# Patient Record
Sex: MALE | Race: WHITE | Employment: UNEMPLOYED | URBAN - METROPOLITAN AREA
[De-identification: names, ages, dates, MRNs, and addresses within clinical notes are randomized per-mention and may not be internally consistent; named-entity substitution may affect disease eponyms.]

---

## 2017-06-02 ENCOUNTER — APPOINTMENT (OUTPATIENT)
Dept: ULTRASOUND IMAGING | Facility: HOSPITAL | Age: 37
DRG: 872 | End: 2017-06-02
Attending: EMERGENCY MEDICINE
Payer: COMMERCIAL

## 2017-06-02 ENCOUNTER — HOSPITAL ENCOUNTER (INPATIENT)
Facility: HOSPITAL | Age: 37
LOS: 6 days | Discharge: HOME | DRG: 872 | End: 2017-06-08
Attending: EMERGENCY MEDICINE | Admitting: HOSPITALIST
Payer: COMMERCIAL

## 2017-06-02 ENCOUNTER — APPOINTMENT (OUTPATIENT)
Dept: GENERAL RADIOLOGY | Facility: HOSPITAL | Age: 37
DRG: 872 | End: 2017-06-02
Attending: EMERGENCY MEDICINE
Payer: COMMERCIAL

## 2017-06-02 DIAGNOSIS — L03.115 CELLULITIS OF RIGHT LOWER EXTREMITY: ICD-10-CM

## 2017-06-02 DIAGNOSIS — A41.9 SEPSIS, DUE TO UNSPECIFIED ORGANISM: ICD-10-CM

## 2017-06-02 DIAGNOSIS — K72.00 ACUTE LIVER FAILURE WITHOUT HEPATIC COMA: Primary | ICD-10-CM

## 2017-06-02 DIAGNOSIS — R18.8 CIRRHOSIS OF LIVER WITH ASCITES, UNSPECIFIED HEPATIC CIRRHOSIS TYPE (HCC): ICD-10-CM

## 2017-06-02 DIAGNOSIS — K74.60 CIRRHOSIS OF LIVER WITH ASCITES, UNSPECIFIED HEPATIC CIRRHOSIS TYPE (HCC): ICD-10-CM

## 2017-06-02 DIAGNOSIS — D69.6 THROMBOCYTOPENIA (HCC): ICD-10-CM

## 2017-06-02 PROBLEM — E87.6 HYPOKALEMIA: Status: ACTIVE | Noted: 2017-06-02

## 2017-06-02 PROBLEM — D64.9 ANEMIA: Status: ACTIVE | Noted: 2017-06-02

## 2017-06-02 PROBLEM — E87.2 METABOLIC ACIDOSIS: Status: ACTIVE | Noted: 2017-06-02

## 2017-06-02 PROCEDURE — 76700 US EXAM ABDOM COMPLETE: CPT | Performed by: EMERGENCY MEDICINE

## 2017-06-02 PROCEDURE — 71020 XR CHEST PA + LAT CHEST (CPT=71020): CPT | Performed by: EMERGENCY MEDICINE

## 2017-06-02 PROCEDURE — 99223 1ST HOSP IP/OBS HIGH 75: CPT | Performed by: HOSPITALIST

## 2017-06-02 RX ORDER — PENTOXIFYLLINE 400 MG/1
400 TABLET, EXTENDED RELEASE ORAL
Status: DISCONTINUED | OUTPATIENT
Start: 2017-06-02 | End: 2017-06-07

## 2017-06-02 RX ORDER — IBUPROFEN 400 MG/1
600 TABLET ORAL EVERY 4 HOURS PRN
Status: DISCONTINUED | OUTPATIENT
Start: 2017-06-02 | End: 2017-06-07

## 2017-06-02 RX ORDER — HYDROMORPHONE HYDROCHLORIDE 1 MG/ML
0.5 INJECTION, SOLUTION INTRAMUSCULAR; INTRAVENOUS; SUBCUTANEOUS ONCE
Status: COMPLETED | OUTPATIENT
Start: 2017-06-02 | End: 2017-06-02

## 2017-06-02 RX ORDER — POTASSIUM CHLORIDE 20 MEQ/1
40 TABLET, EXTENDED RELEASE ORAL EVERY 4 HOURS
Status: COMPLETED | OUTPATIENT
Start: 2017-06-02 | End: 2017-06-02

## 2017-06-02 RX ORDER — POTASSIUM CHLORIDE 20 MEQ/1
40 TABLET, EXTENDED RELEASE ORAL EVERY 4 HOURS
Status: DISCONTINUED | OUTPATIENT
Start: 2017-06-02 | End: 2017-06-02

## 2017-06-02 RX ORDER — SODIUM CHLORIDE 9 MG/ML
INJECTION, SOLUTION INTRAVENOUS CONTINUOUS
Status: ACTIVE | OUTPATIENT
Start: 2017-06-02 | End: 2017-06-02

## 2017-06-02 RX ORDER — ONDANSETRON 2 MG/ML
4 INJECTION INTRAMUSCULAR; INTRAVENOUS EVERY 4 HOURS PRN
Status: DISCONTINUED | OUTPATIENT
Start: 2017-06-02 | End: 2017-06-08

## 2017-06-02 RX ORDER — IBUPROFEN 400 MG/1
400 TABLET ORAL EVERY 4 HOURS PRN
Status: DISCONTINUED | OUTPATIENT
Start: 2017-06-02 | End: 2017-06-07

## 2017-06-02 RX ORDER — SODIUM CHLORIDE 9 MG/ML
INJECTION, SOLUTION INTRAVENOUS ONCE
Status: COMPLETED | OUTPATIENT
Start: 2017-06-02 | End: 2017-06-03

## 2017-06-02 RX ORDER — IBUPROFEN 400 MG/1
200 TABLET ORAL EVERY 4 HOURS PRN
Status: DISCONTINUED | OUTPATIENT
Start: 2017-06-02 | End: 2017-06-07

## 2017-06-02 RX ORDER — ONDANSETRON 2 MG/ML
4 INJECTION INTRAMUSCULAR; INTRAVENOUS ONCE
Status: COMPLETED | OUTPATIENT
Start: 2017-06-02 | End: 2017-06-02

## 2017-06-02 NOTE — PROGRESS NOTES
[facility] Note: Antimicrobial Weight Dose Adjustment for: Zosyn (piperacillin/tazobactam)    Jessie Watson is a 31year old male who has been prescribed Zosyn 3.375gm IVPB q8hr (piperacillin/tazobactam). CrCl is estimated creatinine clearance is 149.

## 2017-06-02 NOTE — PROGRESS NOTES
3rd IV bolus completed on arrival to floor. Due to edema, ok to stop ivf at this time. Ivf stopped. Will continue to monitor.

## 2017-06-02 NOTE — ED PROVIDER NOTES
Patient Seen in: [facility] Emergency Department    History   Patient presents with:  Swelling Edema (cardiovascular, metabolic)  Jaundice (gastrointestinal, hematologic)    Stated Complaint: swelling/jaundice    HPI    Patient is a 30-year-old gent °C)   Temp src 06/02/17 1048 Temporal   SpO2 06/02/17 1048 96 %   O2 Device --        Current:/75 mmHg  Pulse 112  Temp(Src) 101 °F (38.3 °C) (Temporal)  Resp 16  Ht 177.8 cm (5' 10\")  Wt 115.667 kg  BMI 36.59 kg/m2  SpO2 98%        Physical Exam (*)     RDW-SD 75.3 (*)     Lymphocyte Absolute 0.83 (*)     Monocyte Absolute 0.82 (*)     All other components within normal limits   CBC WITH DIFFERENTIAL WITH PLATELET    Narrative:      The following orders were created for panel order CBC WITH DIFFERE Was given 30/kg bolus here. Discussed with the hospitalist.  Patient's blood pressure is stable and heart rate is improving. Will admit to a monitored bed on the floor. Will consult GI. No signs of active bleeding.   Patient is thrombo-cytopenic

## 2017-06-02 NOTE — CONSULTS
[facility]                       [facility]astroenterology Consultation-[facility]    Yasmin Owusu Patient Status:  Emergency    1980 MRN [id number]   Location [facility] [facility] Attending Troy Buerger, MD   [facility] MG/ML injection 0.5 mg 0.5 mg Intravenous Once   [COMPLETED] ondansetron HCl (ZOFRAN) injection 4 mg 4 mg Intravenous Once     Allergies: No Known Allergies  SocHx:  The pt reports that he quit smoking 5 yrs ago;   The patient reports a history of EtOH abus kg/m2  SpO2 98%  Gen: AAO x 3, able to speak in complete sentences  HENT: EOMI, PERRL, oropharynx is clear with moist mucosal membranes  Eyes: Sclerae are icteric  Neck:  Supple without nuchal rigidity  CV: Tachycardic, regular   Resp: Clear to auscultatio Hepatic surface shows nodularity. No hepatic mass seen. No intrahepatic biliary ductal dilation the common bile duct 4 mm.      Splenomegaly 16.7 x 5.7 CM. Aggr is completely obscured by bowel gas.  Within the gallbladder, there is sludge or gallbladder wal upper lung zones are   clear. No congestion or pneumothorax.      =====  CONCLUSION:         Small left effusion and mild left basilar atelectasis versus pneumonia.  Mildly elevated right hemidiaphragm with the possibility of subpulmonic effusion cannot exc He does take vitamins but does not appear to take herbal agents. No biliary dilation on US but he does have ascites. Recommend diagnostic paracentesis but he will need plt transfusion. I would avoid NS and given albumin boluses if he requires fluid.  He was

## 2017-06-02 NOTE — ED INITIAL ASSESSMENT (HPI)
Pt states he noticed swelling to right/left lower leg and abdominal swelling apx 2-3 weeks ago. Pt states now his right lower leg has redness and worsening swelling for apx last 5 days. Pt states he noticed that he was jaundice.

## 2017-06-02 NOTE — H&P
[facility]  History and Physical     Fariba Messing Patient Status:  Emergency    1980 MRN [id number]   Location 656 [facility] Attending Kristy Moura MD   Hosp Day # 0 PCP None Pcp     Chief Complaint: leg swell Ht 177.8 cm (5' 10\")  Wt 255 lb (115.667 kg)  BMI 36.59 kg/m2  SpO2 97%  General: No acute distress. Alert and oriented x 3. Ill appearing   HEENT: Normocephalic, atraumatic. EOM-I. + icteric. Neck: No lymphadenopathy. No JVD. No carotid bruits.   Respir effusion 2/2 fall.     1. Encourage IS    Quality:  · DVT Prophylaxis: SCDs, plt 26,000  · CODE status: full  · Landis: no  Plan of care discussed with patient     Missy Maya MD  6/2/2017

## 2017-06-02 NOTE — PROGRESS NOTES
Unable to start second iv due to poor venous access. Dr. Britany Irwin paged regarding if ok to have one iv in place at this time. Will continue to monitor. 1815: Per Dr. Britany Irwin ok for one iv access.  Dr. Britany Irwin also notified that pt heart rate elevated, 112 at pr

## 2017-06-02 NOTE — ED NOTES
[facility]      Sepsis Reassessment Note    /75 mmHg  Pulse 112  Temp(Src) 101 °F (38.3 °C) (Temporal)  Resp 16  Ht 177.8 cm (5' 10\")  Wt 115.667 kg  BMI 36.59 kg/m2  SpO2 98%     12:44 PM    Cardiac:  Regularity: Regular  Rate: Tachycardic

## 2017-06-03 ENCOUNTER — APPOINTMENT (OUTPATIENT)
Dept: ULTRASOUND IMAGING | Facility: HOSPITAL | Age: 37
DRG: 872 | End: 2017-06-03
Attending: NURSE PRACTITIONER
Payer: COMMERCIAL

## 2017-06-03 PROBLEM — Z91.81 AT RISK FOR FALLING: Status: ACTIVE | Noted: 2017-06-03

## 2017-06-03 PROCEDURE — 49083 ABD PARACENTESIS W/IMAGING: CPT | Performed by: NURSE PRACTITIONER

## 2017-06-03 PROCEDURE — 99232 SBSQ HOSP IP/OBS MODERATE 35: CPT | Performed by: HOSPITALIST

## 2017-06-03 PROCEDURE — 0W9G3ZZ DRAINAGE OF PERITONEAL CAVITY, PERCUTANEOUS APPROACH: ICD-10-PCS | Performed by: RADIOLOGY

## 2017-06-03 RX ORDER — HALOPERIDOL 5 MG/ML
2 INJECTION INTRAMUSCULAR EVERY 6 HOURS PRN
Status: DISCONTINUED | OUTPATIENT
Start: 2017-06-03 | End: 2017-06-03

## 2017-06-03 RX ORDER — HALOPERIDOL 5 MG/ML
2 INJECTION INTRAMUSCULAR ONCE
Status: COMPLETED | OUTPATIENT
Start: 2017-06-03 | End: 2017-06-04

## 2017-06-03 RX ORDER — DIPHENHYDRAMINE HYDROCHLORIDE 50 MG/ML
12.5 INJECTION INTRAMUSCULAR; INTRAVENOUS ONCE
Status: COMPLETED | OUTPATIENT
Start: 2017-06-03 | End: 2017-06-03

## 2017-06-03 RX ORDER — LORAZEPAM 2 MG/ML
1 INJECTION INTRAMUSCULAR EVERY 6 HOURS PRN
Status: DISCONTINUED | OUTPATIENT
Start: 2017-06-03 | End: 2017-06-07

## 2017-06-03 RX ORDER — LORAZEPAM 2 MG/ML
0.5 INJECTION INTRAMUSCULAR EVERY 6 HOURS PRN
Status: DISCONTINUED | OUTPATIENT
Start: 2017-06-03 | End: 2017-06-07

## 2017-06-03 RX ORDER — HALOPERIDOL 5 MG/ML
INJECTION INTRAMUSCULAR
Status: COMPLETED
Start: 2017-06-03 | End: 2017-06-03

## 2017-06-03 RX ORDER — DIPHENHYDRAMINE HYDROCHLORIDE 50 MG/ML
INJECTION INTRAMUSCULAR; INTRAVENOUS
Status: DISPENSED
Start: 2017-06-03 | End: 2017-06-03

## 2017-06-03 NOTE — PLAN OF CARE
GASTROINTESTINAL - ADULT    • Minimal or absence of nausea and vomiting Progressing    • Maintains or returns to baseline bowel function Progressing        GENITOURINARY - ADULT    • Absence of urinary retention Progressing        HEMATOLOGIC - ADULT    •

## 2017-06-03 NOTE — PROGRESS NOTES
Dr Nino Cruz notified of platelets of 47. Order received to transfuse one additional unit of platelets and inform Ultrasound of plan of care. Ultrasound notified. Will endorse to oncoming shift.

## 2017-06-03 NOTE — PROGRESS NOTES
[facility]  Progress Note     Sharene  Patient Status:  Inpatient    1980 MRN [id number]   [facility] 3NW-A Attending Naren Tavera MD   Hosp Day # 1 PCP None Pcp     Chief Complaint: Leg swelling   S:  No overnight e Oral TID CC     ASSESSMENT / PLAN:   1. Cellulitis  1. IV abx and monitor    2. Possible sepsis 2/2 above with fever, tachycardia, lactic acidosis- CBC counts low 2/2 bone marrow suppression  1. As above  2. Albumin as needed. Avoid aggressive IVF  3.  Lact

## 2017-06-03 NOTE — CM/SW NOTE
SW received consult for PHQ4 result. Attempted to meet with patient, and patient reported, \"I'm not here right now, I'm at my neighbors. \"  Per chart, patient appears to be hallucinating.  SW spoke with patient's RN who stated patient is moving to med/psyc

## 2017-06-03 NOTE — PLAN OF CARE
Pt returned from paracentesis @1450. VSS, afebrile. Tele monitor called Writing RN stating pt had 20 beats of SVT @1500 with 's. Pt assessed again and HR back down to 90's and continues to deny chest pain/SOB/palpitations. Pt denies abdominal pain.  P

## 2017-06-03 NOTE — PLAN OF CARE
Dr. Juliana Vo notified of post-transfusion labs and that ultrasound will be proceeding with paracentesis. Ultrasound also stated pt does not need to be NPO for procedure. MD also informed of this, and low sodium diet ordered.  Pt informed of diet advancem

## 2017-06-03 NOTE — PROGRESS NOTES
Gastroenterology Progress Note  S: Feeling ok today, tolerating diet. Leg doesn't seem much better. No fever. No abd pain, nausea.   O: /70 mmHg  Pulse 95  Temp(Src) 98.3 °F (36.8 °C) (Oral)  Resp 18  Ht 177.8 cm (5' 10\")  Wt 255 lb (115.667 kg)  BMI

## 2017-06-03 NOTE — PLAN OF CARE
Pt called Writing RN into room, stating, \"I think I'm starting to hallucinate. \" Pt reports seeing objects moving in the room. Pt is A/Ox4. Tele monitor initially showed 's, however quickly came down to 100's. B/P stable.  Dr. Abeba Real notified of above

## 2017-06-03 NOTE — PROCEDURES
PROCEDURE: ULTRASOUND GUIDED PERITONEOCENTESIS    COMPARISON: [facility] US ABDOMEN COMPLETE (CPT=76700), 6/02/2017, 11:16.     INDICATIONS: New onset ascites; r/o SBP    DESCRIPTION OF PROCEDURE: The risks, benefits, and alternatives of the procedure were ex

## 2017-06-04 PROCEDURE — 99232 SBSQ HOSP IP/OBS MODERATE 35: CPT | Performed by: HOSPITALIST

## 2017-06-04 RX ORDER — LORAZEPAM 2 MG/ML
2 INJECTION INTRAMUSCULAR
Status: DISCONTINUED | OUTPATIENT
Start: 2017-06-04 | End: 2017-06-05

## 2017-06-04 RX ORDER — SPIRONOLACTONE 25 MG/1
50 TABLET ORAL DAILY
Status: DISCONTINUED | OUTPATIENT
Start: 2017-06-04 | End: 2017-06-08

## 2017-06-04 RX ORDER — LORAZEPAM 1 MG/1
1 TABLET ORAL
Status: DISCONTINUED | OUTPATIENT
Start: 2017-06-04 | End: 2017-06-05

## 2017-06-04 RX ORDER — LACTULOSE 10 G/15ML
20 SOLUTION ORAL 3 TIMES DAILY
Status: DISCONTINUED | OUTPATIENT
Start: 2017-06-04 | End: 2017-06-07

## 2017-06-04 RX ORDER — LORAZEPAM 2 MG/ML
1 INJECTION INTRAMUSCULAR
Status: DISCONTINUED | OUTPATIENT
Start: 2017-06-04 | End: 2017-06-05

## 2017-06-04 RX ORDER — LORAZEPAM 1 MG/1
2 TABLET ORAL
Status: DISCONTINUED | OUTPATIENT
Start: 2017-06-04 | End: 2017-06-05

## 2017-06-04 RX ORDER — HALOPERIDOL 5 MG/ML
2 INJECTION INTRAMUSCULAR EVERY 4 HOURS PRN
Status: DISCONTINUED | OUTPATIENT
Start: 2017-06-04 | End: 2017-06-08

## 2017-06-04 RX ORDER — POTASSIUM CHLORIDE 20 MEQ/1
40 TABLET, EXTENDED RELEASE ORAL EVERY 4 HOURS
Status: DISCONTINUED | OUTPATIENT
Start: 2017-06-04 | End: 2017-06-04

## 2017-06-04 RX ORDER — FUROSEMIDE 20 MG/1
20 TABLET ORAL DAILY
Status: DISCONTINUED | OUTPATIENT
Start: 2017-06-04 | End: 2017-06-08

## 2017-06-04 RX ORDER — HEPARIN SODIUM 5000 [USP'U]/ML
30 INJECTION, SOLUTION INTRAVENOUS; SUBCUTANEOUS ONCE
Status: DISCONTINUED | OUTPATIENT
Start: 2017-06-04 | End: 2017-06-04

## 2017-06-04 NOTE — PROGRESS NOTES
Orders for CIWA protocol received. Precautions set up. Refusing IVF. Patient refusing tele monitor, keeping  on desating while sleeping, O2 placed via nasal canula. Will endorse.

## 2017-06-04 NOTE — PROGRESS NOTES
Patient noted to have erratic behavior, confused and paranoid    Received ativan for suspected withdrawals earlier in evening. Stat Ammonia was draw and found to be elevated at 45-  lactulose ordered    Jonnie Breen M.D.   [facility]

## 2017-06-04 NOTE — PROGRESS NOTES
Patient woke up and continues to be agitated and paranoid. Appears to be suspicious of staff. Patient began threatening to leave. Patient walking around with only boxers and socks.  Patient picked up the foot stool and was threatening to throw it out the

## 2017-06-04 NOTE — PROGRESS NOTES
Patient transferred to CTU 3, Room 3605 by bed on cardiac monitor, patient transferred in stable condition. Telephone report given to Amena Delgado RN.

## 2017-06-04 NOTE — PROGRESS NOTES
Behaviors: This charge RN is called to the patient's room at approximately 2250, because the patient had took off his telemetry box and refused to put it back on. Patient then went into the bathroom and refused to come out.  Patient was on his cell phone in

## 2017-06-04 NOTE — PROGRESS NOTES
Patient becoming increasingly agitated. Patient upset with bed alarm and IV antibiotics. Attempted to redirect patient, ativan 1mg IV administered at 2216. Patient pacing back and forth took off tele monitor and attempting to remove IV.   Patient with un

## 2017-06-04 NOTE — PROGRESS NOTES
[facility]  Progress Note     Fili Whitt Patient Status:  Inpatient    1980 MRN [id number]   [facility] 3NW-A Attending Johanna Griggs MD   Hosp Day # 2 PCP None Pcp     Chief Complaint: Leg swelling   S:  Had AMS in las 20.9*  23.9*   INR  1.88*  1.78*  2.10*     No results for input(s): TROP, CK in the last 72 hours. Imaging: Imaging data reviewed in Epic.   Medications:   • lactulose  20 g Oral TID   • multivitamin/thiamine/folic acid infusion   Intravenous Q24H   •

## 2017-06-04 NOTE — PROGRESS NOTES
Gastroenterology Progress Note  S: Came back from paracentesis confused and combative, had to be transferred to tele floor with sitter. Sleepy but arousable this morning. No asterixis. Slow to respond. Oriented to self, but not place or time.   O: /66 Antibiotics per primary service  3. Lasix 20/Aldactone 50  4. Lactulose bid  5. Avoid benzos  6.  EGD prior to dc, unable to consent today due to altered mental status  Bakari Mercado MD  11:13 AM  6/4/2017  [facility] Gastroenterology  [phone]

## 2017-06-05 ENCOUNTER — ANESTHESIA EVENT (OUTPATIENT)
Dept: ENDOSCOPY | Facility: HOSPITAL | Age: 37
DRG: 872 | End: 2017-06-05
Payer: COMMERCIAL

## 2017-06-05 PROCEDURE — 99232 SBSQ HOSP IP/OBS MODERATE 35: CPT | Performed by: HOSPITALIST

## 2017-06-05 RX ORDER — POTASSIUM CHLORIDE 20 MEQ/1
40 TABLET, EXTENDED RELEASE ORAL EVERY 4 HOURS
Status: COMPLETED | OUTPATIENT
Start: 2017-06-05 | End: 2017-06-05

## 2017-06-05 NOTE — PROGRESS NOTES
[facility]  Progress Note     Geoff Hatfield Patient Status:  Inpatient    1980 MRN [id number]   [facility] 3NW-A Attending Kit MD Ric   Hosp Day # 3 PCP None Pcp     Chief Complaint: Leg swelling   S:  Patient much i 06/03/17   1020  06/04/17   0555  06/05/17   0604   PTP  20.9*  23.9*  24.8*   INR  1.78*  2.10*  2.20*     No results for input(s): TROP, CK in the last 72 hours. Imaging: Imaging data reviewed in Epic.   Medications:   • Potassium Chloride ER  40 mEq

## 2017-06-05 NOTE — PROGRESS NOTES
Received patient at 0730  Patient A/O x 4  ST on tele, ranging from 100's sitting to 130's when ambulating  BP stable  Potassium replaced per protocol  PO vitamin K today  Plan for EGD tomorrow  NPO after midnight  Will continue to monitor.

## 2017-06-05 NOTE — BH PROGRESS NOTE
Went to see the pt for sailaja level of care assessment. The pt states he doesn't need the assessment because he has drank in 5 years. He refused any referrals. His nurse is aware.

## 2017-06-05 NOTE — PHYSICAL THERAPY NOTE
Pt identified through fall risk protocol, per pt and confirmed by rn, pt is ind with mobility and PT not indicated at this time. Pt witnessed by PT ambulating in halls independently. Will dc from PT services. rn aware.

## 2017-06-05 NOTE — PLAN OF CARE
Patient was confused when he woke up this am, but since has been a/ox4. He has been calm and cooperative, and following his poc. His right leg looks less red then yesterday. It has been elevated.   Patient denies pain and has not had symptoms of alcohol

## 2017-06-05 NOTE — PAYOR COMM NOTE
Attending Physician: Luis Antonio Larios MD      6/4  CONTINUED STAY    HAD PARACENTESIS TODAY    ABD MILD DISTENDED  Skin: Chronic venous stasis changes of b/l LE with more intense erythema in serpinginous pattern involving RLE, extending to inner thigh, over

## 2017-06-05 NOTE — PLAN OF CARE
CONFUSION    • Confusion, delirium, dementia or psychosis is improved or at baseline Progressing        GASTROINTESTINAL - ADULT    • Minimal or absence of nausea and vomiting Progressing    • Maintains or returns to baseline bowel function Progressing

## 2017-06-05 NOTE — PROGRESS NOTES
[facility]    Gastroenterology Follow-Up Note      Liborio Hill Patient Status:  Inpatient    1980 MRN [id number]   [facility] 3NE-A Attending Eloisa Wisdom MD   Hosp Day # 3 PCP None Pcp     Chief Complaint/Reason for [facility] FFP/platelets prior to procedure    Cellulitis  *IV abx per primary    Etoh Cirrhosis  *MELD 23  *Last drink 4 months ago, patient requires expedited evaluation by transplant hepatology      I have spent > 45 total minutes with the patient >50% of visit wa

## 2017-06-05 NOTE — ANESTHESIA PREPROCEDURE EVALUATION
PRE-OP EVALUATION    Patient Name: Fariba Cespedes    Pre-op Diagnosis: Cirrhosis of liver with ascites, unspecified hepatic cirrhosis type ([facility] 75.) [K74.60]    Procedure(s):  ESOPHAGOGASTRODUODENOSCOPY     Surgeon(s) and Role:     * Mary Mcleod DO - Pr chloride 40 mEq in sodium chloride 0.9 % 250 mL IVPB 40 mEq Intravenous Once   [COMPLETED] magnesium sulfate IVPB premix 2 g 2 g Intravenous Once   LORazepam (ATIVAN) injection 0.5 mg 0.5 mg Intravenous Q6H PRN   Or      LORazepam (ATIVAN) injection 1 mg 1 reviewed. Anesthetic Complications           GI/Hepatic/Renal  Comment: Alcoholic hepatitis, Liver failure. Ascitis.     (+) GERD and poorly controlled         (+) liver disease                 Cardiovascular        Exercise tolerance: good     MET: >4 limited to sore throat, nausea/vomiting, dental/oral damage, and cardiac/pulmonary complications. Pt understands risks and wishes to proceed with above plan. All questions answered.      Plan/risks discussed with: patient                Present on Admission

## 2017-06-05 NOTE — PAYOR COMM NOTE
Attending Physician: Naren Tavera MD    6/2    ED           Patient presents with:  Swelling Edema (cardiovascular, metabolic)  Jaundice (gastrointestinal, hematologic)    Stated Complaint: swelling/jaundice        + REDNESS RIGHT LEG LAST 3-4 DAYS  TEM below (*)        Hypochromia  Small (*)        Microcytosis  Small (*)        Target Cells  Moderate (*)                   RBC  3.68 (*)        HGB  9.8 (*)        HCT  31.1 (*)        PLT  26.0 (*)        MCH  26.6 (*)        RDW  24.7 (*)        RDW-SD

## 2017-06-06 ENCOUNTER — SURGERY (OUTPATIENT)
Age: 37
End: 2017-06-06

## 2017-06-06 ENCOUNTER — ANESTHESIA (OUTPATIENT)
Dept: ENDOSCOPY | Facility: HOSPITAL | Age: 37
DRG: 872 | End: 2017-06-06
Payer: COMMERCIAL

## 2017-06-06 PROCEDURE — 99232 SBSQ HOSP IP/OBS MODERATE 35: CPT | Performed by: HOSPITALIST

## 2017-06-06 PROCEDURE — 06L34CZ OCCLUSION OF ESOPHAGEAL VEIN WITH EXTRALUMINAL DEVICE, PERCUTANEOUS ENDOSCOPIC APPROACH: ICD-10-PCS | Performed by: INTERNAL MEDICINE

## 2017-06-06 RX ORDER — SODIUM CHLORIDE 9 MG/ML
INJECTION, SOLUTION INTRAVENOUS ONCE
Status: COMPLETED | OUTPATIENT
Start: 2017-06-06 | End: 2017-06-06

## 2017-06-06 RX ORDER — NALOXONE HYDROCHLORIDE 0.4 MG/ML
80 INJECTION, SOLUTION INTRAMUSCULAR; INTRAVENOUS; SUBCUTANEOUS AS NEEDED
Status: DISCONTINUED | OUTPATIENT
Start: 2017-06-06 | End: 2017-06-06 | Stop reason: HOSPADM

## 2017-06-06 RX ORDER — ONDANSETRON 2 MG/ML
4 INJECTION INTRAMUSCULAR; INTRAVENOUS AS NEEDED
Status: DISCONTINUED | OUTPATIENT
Start: 2017-06-06 | End: 2017-06-06 | Stop reason: HOSPADM

## 2017-06-06 RX ORDER — SODIUM CHLORIDE, SODIUM LACTATE, POTASSIUM CHLORIDE, CALCIUM CHLORIDE 600; 310; 30; 20 MG/100ML; MG/100ML; MG/100ML; MG/100ML
INJECTION, SOLUTION INTRAVENOUS CONTINUOUS
Status: DISCONTINUED | OUTPATIENT
Start: 2017-06-06 | End: 2017-06-07

## 2017-06-06 RX ORDER — POTASSIUM CHLORIDE 20 MEQ/1
40 TABLET, EXTENDED RELEASE ORAL ONCE
Status: COMPLETED | OUTPATIENT
Start: 2017-06-06 | End: 2017-06-06

## 2017-06-06 RX ORDER — HYDROCODONE BITARTRATE AND ACETAMINOPHEN 5; 325 MG/1; MG/1
2 TABLET ORAL EVERY 4 HOURS PRN
Status: DISCONTINUED | OUTPATIENT
Start: 2017-06-06 | End: 2017-06-07

## 2017-06-06 RX ORDER — HYDROCODONE BITARTRATE AND ACETAMINOPHEN 5; 325 MG/1; MG/1
1 TABLET ORAL EVERY 4 HOURS PRN
Status: DISCONTINUED | OUTPATIENT
Start: 2017-06-06 | End: 2017-06-08

## 2017-06-06 RX ORDER — HYDROCODONE BITARTRATE AND ACETAMINOPHEN 5; 325 MG/1; MG/1
1 TABLET ORAL AS NEEDED
Status: DISCONTINUED | OUTPATIENT
Start: 2017-06-06 | End: 2017-06-06 | Stop reason: HOSPADM

## 2017-06-06 RX ORDER — HYDROCODONE BITARTRATE AND ACETAMINOPHEN 5; 325 MG/1; MG/1
2 TABLET ORAL AS NEEDED
Status: DISCONTINUED | OUTPATIENT
Start: 2017-06-06 | End: 2017-06-06 | Stop reason: HOSPADM

## 2017-06-06 RX ORDER — HYDROMORPHONE HYDROCHLORIDE 1 MG/ML
0.5 INJECTION, SOLUTION INTRAMUSCULAR; INTRAVENOUS; SUBCUTANEOUS ONCE
Status: COMPLETED | OUTPATIENT
Start: 2017-06-06 | End: 2017-06-06

## 2017-06-06 RX ORDER — AMOXICILLIN AND CLAVULANATE POTASSIUM 875; 125 MG/1; MG/1
1 TABLET, FILM COATED ORAL EVERY 12 HOURS SCHEDULED
Status: DISCONTINUED | OUTPATIENT
Start: 2017-06-06 | End: 2017-06-08

## 2017-06-06 NOTE — OPERATIVE REPORT
EGD WITH BANDING      Safia Heller Patient Status:  Inpatient    1980 MRN [id number]   [facility] Attending Thi Rivas MD   Hosp Day # 4 PCP None Pcp       PREOPERATIVE DIAGNOSIS/INDICATION: Kenia Bocanegra duodenal bulb was normal. The examined descending duodenum was normal.      IMPRESSION:   1. Grade III esophageal varices s/p variceal banding x 4.    2. Portal hypertensive gastropathy and gastric erosions.    RECOMMENDATIONS:    1. [id number] Kaelyn Robins for full liquids tod

## 2017-06-06 NOTE — PLAN OF CARE
Pt is alert and oriented x4, states mild pain in abdomen, pt taking lactulose, plan for EGD tomorrow, NPO at midnight.  Pt is able to rest comfortably, will continue to monitor    CONFUSION    • Confusion, delirium, dementia or psychosis is improved or at b

## 2017-06-06 NOTE — PROGRESS NOTES
[facility]  Progress Note     Solitario Aponte Patient Status:  Inpatient    1980 MRN [id number]   [facility] 3NW-A Attending Ilya Medrano MD   Hosp Day # 4 PCP None Pcp     Chief Complaint: Leg swelling   S:  Patient contin 2.10*  2.20*  2.07*     No results for input(s): TROP, CK in the last 72 hours. Imaging: Imaging data reviewed in Epic.   Medications:   • lactulose  20 g Oral TID   • furosemide  20 mg Oral Daily   • spironolactone  50 mg Oral Daily   • piperacillin-ta

## 2017-06-06 NOTE — H&P (VIEW-ONLY)
[facility]                       Gastroenterology Consultation-[facility] Gastroenterology    [facility]:  [facility]    1980 MRN [id number]   Location 656 [facility] Attending Meño Rashid MD   [address] MG/ML injection 0.5 mg 0.5 mg Intravenous Once   [COMPLETED] ondansetron HCl (ZOFRAN) injection 4 mg 4 mg Intravenous Once     Allergies: No Known Allergies  SocHx:  The pt reports that he quit smoking 5 yrs ago;   The patient reports a history of EtOH abus kg/m2  SpO2 98%  Gen: AAO x 3, able to speak in complete sentences  HENT: EOMI, PERRL, oropharynx is clear with moist mucosal membranes  Eyes: Sclerae are icteric  Neck:  Supple without nuchal rigidity  CV: Tachycardic, regular   Resp: Clear to auscultatio Hepatic surface shows nodularity. No hepatic mass seen. No intrahepatic biliary ductal dilation the common bile duct 4 mm.      Splenomegaly 16.7 x 5.7 CM. Aggr is completely obscured by bowel gas.  Within the gallbladder, there is sludge or gallbladder wal upper lung zones are   clear. No congestion or pneumothorax.      =====  CONCLUSION:         Small left effusion and mild left basilar atelectasis versus pneumonia.  Mildly elevated right hemidiaphragm with the possibility of subpulmonic effusion cannot exc He does take vitamins but does not appear to take herbal agents. No biliary dilation on US but he does have ascites. Recommend diagnostic paracentesis but he will need plt transfusion. I would avoid NS and given albumin boluses if he requires fluid.  He was

## 2017-06-06 NOTE — INTERVAL H&P NOTE
Pre-op Diagnosis: Cirrhosis of liver with ascites, unspecified hepatic cirrhosis type ([facility] [K74.60]    The above referenced H&P was reviewed by Bebo Bello DO on 6/6/2017, the patient was examined and no significant changes have occurred in the patie

## 2017-06-06 NOTE — PAYOR COMM NOTE
--------------  CONTINUED STAY REVIEW      6/6    LABS  WBC 2.6  HG 8.7      TO OR                    PREOPERATIVE DIAGNOSIS/INDICATION:  Cirrhosis, Variceal screening  POSTOPERTATIVE DIAGNOSIS: Grade III esophageal varices  PROCEDURE PERFORMED:  EGD WITH

## 2017-06-07 ENCOUNTER — APPOINTMENT (OUTPATIENT)
Dept: CV DIAGNOSTICS | Facility: HOSPITAL | Age: 37
DRG: 872 | End: 2017-06-07
Attending: HOSPITALIST
Payer: COMMERCIAL

## 2017-06-07 PROCEDURE — 99255 IP/OBS CONSLTJ NEW/EST HI 80: CPT | Performed by: INTERNAL MEDICINE

## 2017-06-07 PROCEDURE — 93306 TTE W/DOPPLER COMPLETE: CPT | Performed by: HOSPITALIST

## 2017-06-07 PROCEDURE — 99232 SBSQ HOSP IP/OBS MODERATE 35: CPT | Performed by: INTERNAL MEDICINE

## 2017-06-07 RX ORDER — LACTULOSE 10 G/15ML
20 SOLUTION ORAL 2 TIMES DAILY
Status: DISCONTINUED | OUTPATIENT
Start: 2017-06-07 | End: 2017-06-08

## 2017-06-07 RX ORDER — SPIRONOLACTONE 50 MG/1
50 TABLET, FILM COATED ORAL DAILY
Qty: 30 TABLET | Refills: 5 | Status: SHIPPED | OUTPATIENT
Start: 2017-06-07 | End: 2017-10-27

## 2017-06-07 RX ORDER — FUROSEMIDE 20 MG/1
20 TABLET ORAL DAILY
Qty: 30 TABLET | Refills: 5 | Status: SHIPPED | OUTPATIENT
Start: 2017-06-07 | End: 2017-10-27

## 2017-06-07 RX ORDER — POTASSIUM CHLORIDE 20 MEQ/1
40 TABLET, EXTENDED RELEASE ORAL ONCE
Status: COMPLETED | OUTPATIENT
Start: 2017-06-07 | End: 2017-06-07

## 2017-06-07 RX ORDER — MELATONIN
325 2 TIMES DAILY WITH MEALS
Status: DISCONTINUED | OUTPATIENT
Start: 2017-06-07 | End: 2017-06-08

## 2017-06-07 RX ORDER — MELATONIN
325 2 TIMES DAILY WITH MEALS
Qty: 60 TABLET | Refills: 5 | Status: SHIPPED | OUTPATIENT
Start: 2017-06-07 | End: 2017-10-27

## 2017-06-07 RX ORDER — LACTULOSE 10 G/15ML
20 SOLUTION ORAL 2 TIMES DAILY
Qty: 1 BOTTLE | Refills: 5 | Status: SHIPPED | OUTPATIENT
Start: 2017-06-07 | End: 2017-10-27

## 2017-06-07 NOTE — PROGRESS NOTES
[facility]    Gastroenterology Follow-Up Note      Beto Mcleod Patient Status:  Inpatient    1980 MRN [id number]   [facility] 3NE-A Attending Ace Prabhakar MD   Hosp Day # 5 PCP None Pcp     Chief Complaint/Reason for [facility] GI standpoint. Will sign off at this time, please call with questions. I have spent > 26 total minutes with the patient >50% of visit was spent in counseling and coordination of care.       Angie Reese  Gastroenterology/Advanced Endoscopy  [facility]

## 2017-06-07 NOTE — PLAN OF CARE
Pt is alert and oriented x4, states pain to abdomen and throat, medication given. Pt is able to rest comfortably. Pt tolerating full liquid diet. Will switch to soft in the morning.  Will continue to monitor    CONFUSION    • Confusion, delirium, dementia o

## 2017-06-07 NOTE — CONSULTS
Hematology Initial Consultation Note    Patient Name: Rayna Faust  Medical Record Number: [id number]    YOB: 1980   Date of Consultation: 6/7/2017   Physician requesting consultation: Dr. Jaswinder Zimmer    Reason for Consultation:  Zuleyka Calderón furosemide  20 mg Oral Daily   • spironolactone  50 mg Oral Daily   • Pentoxifylline ER  400 mg Oral TID CC        PRN Medications:  HYDROcodone-acetaminophen **OR** [DISCONTINUED] HYDROcodone-acetaminophen, haloperidol lactate, haloperidol lactate, ondans and buldging flanks,   Neurological: Grossly intact   Lymphatics: No palpable lymphadenopathy  Skin: no rashes or petechiae, + jaundice. Ext: +BLE edema is symmetric.   Trace RLE erythema, pt states is much better than on presentation    Laboratory:  Rec visualized it is normal limits. Ascites noted.  Amount of ascites considered moderate in the upper abdomen, small in    the lower abdomen.     =====  CONCLUSION:  Findings of cirrhosis and portal hypertension, with cirrhotic ultrasound appearance of the michael

## 2017-06-07 NOTE — PROGRESS NOTES
[facility]  Progress Note     Rita Smyth Patient Status:  Inpatient    1980 MRN [id number]   [facility] 3NW-A Attending Tj Magana MD   Hosp Day # 5 PCP None Pcp     Chief Complaint: Leg swelling     S:  Patient saw 06/06/17   0606  06/06/17   1120  06/06/17   1533   PTP  24.8*  23.6*   --   21.2*   INR  2.20*  2.07*  1.4*  1.81*     No results for input(s): TROP, CK in the last 72 hours. Imaging: Imaging data reviewed in Epic.   Medications:   • ferrous sulfate  3

## 2017-06-08 VITALS
OXYGEN SATURATION: 92 % | RESPIRATION RATE: 20 BRPM | TEMPERATURE: 99 F | DIASTOLIC BLOOD PRESSURE: 62 MMHG | SYSTOLIC BLOOD PRESSURE: 120 MMHG | WEIGHT: 254.31 LBS | HEART RATE: 98 BPM | HEIGHT: 70 IN | BODY MASS INDEX: 36.41 KG/M2

## 2017-06-08 PROCEDURE — 99239 HOSP IP/OBS DSCHRG MGMT >30: CPT | Performed by: INTERNAL MEDICINE

## 2017-06-08 RX ORDER — AMOXICILLIN AND CLAVULANATE POTASSIUM 875; 125 MG/1; MG/1
1 TABLET, FILM COATED ORAL EVERY 12 HOURS SCHEDULED
Qty: 4 TABLET | Refills: 0 | Status: SHIPPED | OUTPATIENT
Start: 2017-06-08 | End: 2017-06-10

## 2017-06-08 NOTE — PAYOR COMM NOTE
--------------  CONTINUED STAY REVIEW    Payor: STEFANO JIMENEZ POS/KATRINA  Authorization Number: [id number]    Admit date: 6/2/2017 10:30 AM       Admitting Physician: Jonathan Moya MD  Attending Physician:  Enoch Lucero MD  Primary Care Physician: Pcp, None INR   2.20*   2.07*   1.4*   1.81*      Recent Labs    Lab  06/05/17   0604   06/05/17   1657   06/06/17   0606   06/07/17   0503    GLU   85    --    83    --     BUN   5*    --    6*    --     CREATSERUM   0.56*    --    0.60*    --     CA   7.7*    -- cirrhosis  9. Pancytopenia    1. Possibly chronic 2/2 bone marrow suppression. HCV ab negative. 2. Heme consult  10. Hypokalemia- Replace    11.  Malnutrition                MEDICATIONS ADMINISTERED IN LAST 1 DAY:  Amoxicillin-Pot Clavulanate (AUGMENTIN) 8

## 2017-06-08 NOTE — PLAN OF CARE
Pt is alert and oriented x4, stooling frequently. Mild pain to abdomen.  Pt is able to rest comfortably, will continue to monitor      CONFUSION    • Confusion, delirium, dementia or psychosis is improved or at baseline Progressing        GASTROINTESTINAL -

## 2017-06-11 NOTE — DISCHARGE SUMMARY
[facility]  DISCHARGE SUMMARY     Marilia Arauz Patient Status:  Inpatient    1980 MRN [id number]   [facility] 3NE-A Attending No att. providers found   Hosp Day # 6 PCP None Pcp     Date of Admission: 2017  Date of Discha over the last few years.  He had heavy alcohol use earlier in his life.  Patient denied chest pain or shortness of breath.  He does have abdominal swelling which has worsened over the last week.  He denies dysuria or hematuria.  He does complain of jaundic hematology      Discharge Medication List:     Discharge Medications      START taking these medications       Instructions Prescription details    Amoxicillin-Pot Clavulanate 875-125 MG Tabs   Last time this was given:  1 tablet on 6/8/2017  9:12 AM   Com Follow-up appointment:   Cindi River MD  [facility] 6.  [phone]    Go on 7/17/2017  at 2:45 pm for an evaluation by the liver specialist       Vital signs:    Blood pressure 120/62, pulse 98, temperature

## 2017-07-17 ENCOUNTER — LAB ENCOUNTER (OUTPATIENT)
Dept: LAB | Facility: HOSPITAL | Age: 37
End: 2017-07-17
Attending: INTERNAL MEDICINE
Payer: COMMERCIAL

## 2017-07-17 DIAGNOSIS — K70.31 ALCOHOLIC CIRRHOSIS OF LIVER WITH ASCITES (HCC): ICD-10-CM

## 2017-07-17 LAB
ALBUMIN SERPL-MCNC: 2.5 G/DL (ref 3.5–4.8)
ALP LIVER SERPL-CCNC: 141 U/L (ref 45–117)
ALT SERPL-CCNC: 29 U/L (ref 17–63)
AST SERPL-CCNC: 46 U/L (ref 15–41)
BASOPHILS # BLD AUTO: 0.03 X10(3) UL (ref 0–0.1)
BASOPHILS NFR BLD AUTO: 0.8 %
BILIRUB SERPL-MCNC: 3.7 MG/DL (ref 0.1–2)
BUN BLD-MCNC: 9 MG/DL (ref 8–20)
CALCIUM BLD-MCNC: 8.6 MG/DL (ref 8.3–10.3)
CHLORIDE: 108 MMOL/L (ref 101–111)
CO2: 22 MMOL/L (ref 22–32)
CREAT BLD-MCNC: 0.65 MG/DL (ref 0.7–1.3)
EOSINOPHIL # BLD AUTO: 0.12 X10(3) UL (ref 0–0.3)
EOSINOPHIL NFR BLD AUTO: 3.2 %
ERYTHROCYTE [DISTWIDTH] IN BLOOD BY AUTOMATED COUNT: 16.8 % (ref 11.5–16)
GLUCOSE BLD-MCNC: 115 MG/DL (ref 70–99)
HCT VFR BLD AUTO: 33.2 % (ref 37–53)
HGB BLD-MCNC: 11.1 G/DL (ref 13–17)
IMMATURE GRANULOCYTE COUNT: 0.01 X10(3) UL (ref 0–1)
IMMATURE GRANULOCYTE RATIO %: 0.3 %
INR BLD: 1.64 (ref 0.89–1.11)
LYMPHOCYTES # BLD AUTO: 0.89 X10(3) UL (ref 0.9–4)
LYMPHOCYTES NFR BLD AUTO: 23.9 %
M PROTEIN MFR SERPL ELPH: 6.5 G/DL (ref 6.1–8.3)
MCH RBC QN AUTO: 28.7 PG (ref 27–33.2)
MCHC RBC AUTO-ENTMCNC: 33.4 G/DL (ref 31–37)
MCV RBC AUTO: 85.8 FL (ref 80–99)
MONOCYTES # BLD AUTO: 0.39 X10(3) UL (ref 0.1–0.6)
MONOCYTES NFR BLD AUTO: 10.5 %
NEUTROPHIL ABS PRELIM: 2.29 X10 (3) UL (ref 1.3–6.7)
NEUTROPHILS # BLD AUTO: 2.29 X10(3) UL (ref 1.3–6.7)
NEUTROPHILS NFR BLD AUTO: 61.3 %
PLATELET # BLD AUTO: 58 10(3)UL (ref 150–450)
POTASSIUM SERPL-SCNC: 4.1 MMOL/L (ref 3.6–5.1)
PSA SERPL DL<=0.01 NG/ML-MCNC: 19.6 SECONDS (ref 12–14.3)
RBC # BLD AUTO: 3.87 X10(6)UL (ref 4.3–5.7)
RED CELL DISTRIBUTION WIDTH-SD: 52.5 FL (ref 35.1–46.3)
SODIUM SERPL-SCNC: 138 MMOL/L (ref 136–144)
WBC # BLD AUTO: 3.7 X10(3) UL (ref 4–13)

## 2017-07-17 PROCEDURE — 36415 COLL VENOUS BLD VENIPUNCTURE: CPT

## 2017-07-17 PROCEDURE — 85610 PROTHROMBIN TIME: CPT

## 2017-07-17 PROCEDURE — 80053 COMPREHEN METABOLIC PANEL: CPT

## 2017-07-17 PROCEDURE — 85025 COMPLETE CBC W/AUTO DIFF WBC: CPT

## 2017-07-17 NOTE — PROGRESS NOTES
[facility] at [facility]  [address] [facility], [address]  [address]  [phone] ([phone]) Comment: none since 2/2017. +hx of excessive etoh               use (12beers/day + vodka), quit daily               drinking 2014, but still wtih binges.   Lives with parents  Currently not working       Current Outpatient Prescriptions:   •  furos copper studies  - Follow up 4-6 weeks    Ke Deshpande MD  Director of Hepatology  Medical Director of [address] [place] [address] of [address]  [address] [place] [address] 80, 7th floor, Omayra Lópze [facility], [address] [facility]

## 2017-07-18 ENCOUNTER — TELEPHONE (OUTPATIENT)
Dept: HEMATOLOGY/ONCOLOGY | Facility: HOSPITAL | Age: 37
End: 2017-07-18

## 2017-07-19 PROCEDURE — 82525 ASSAY OF COPPER: CPT

## 2017-07-19 PROCEDURE — 82570 ASSAY OF URINE CREATININE: CPT

## 2017-07-20 ENCOUNTER — APPOINTMENT (OUTPATIENT)
Dept: LAB | Facility: HOSPITAL | Age: 37
End: 2017-07-20
Attending: INTERNAL MEDICINE
Payer: COMMERCIAL

## 2017-07-20 DIAGNOSIS — R18.8 CIRRHOSIS OF LIVER WITH ASCITES, UNSPECIFIED HEPATIC CIRRHOSIS TYPE (HCC): ICD-10-CM

## 2017-07-20 DIAGNOSIS — K74.60 CIRRHOSIS OF LIVER WITH ASCITES, UNSPECIFIED HEPATIC CIRRHOSIS TYPE (HCC): ICD-10-CM

## 2017-07-20 LAB
CREAT UR-SCNC: 2.05 G/24 HR (ref 0.95–2.49)
SPECIMEN VOL UR: 1990 ML

## 2017-07-24 LAB
COPPER, URINE - UG/DAY: 22 UG/D
COPPER, URINE - UG/DL: 1.1 UG/DL
COPPER, URINE - UG/GCRT: 12.4 UG/G CRT
CREATININE, URINE - PER 24H: 1771 MG/D
CREATININE, URINE - PER VOLUME: 89 MG/DL
HOURS COLLECTED: 24 HR
TOTAL VOLUME: 1990 ML

## 2017-08-14 ENCOUNTER — HOSPITAL ENCOUNTER (OUTPATIENT)
Dept: MRI IMAGING | Age: 37
Discharge: HOME | End: 2017-08-14
Attending: INTERNAL MEDICINE
Payer: COMMERCIAL

## 2017-08-14 DIAGNOSIS — R18.8 CIRRHOSIS OF LIVER WITH ASCITES, UNSPECIFIED HEPATIC CIRRHOSIS TYPE (HCC): ICD-10-CM

## 2017-08-14 DIAGNOSIS — K74.60 CIRRHOSIS OF LIVER WITH ASCITES, UNSPECIFIED HEPATIC CIRRHOSIS TYPE (HCC): ICD-10-CM

## 2017-08-14 PROCEDURE — 74183 MRI ABD W/O CNTR FLWD CNTR: CPT | Performed by: INTERNAL MEDICINE

## 2017-08-14 PROCEDURE — A9581 GADOXETATE DISODIUM INJ: HCPCS | Performed by: INTERNAL MEDICINE

## 2017-10-23 ENCOUNTER — HOSPITAL ENCOUNTER (INPATIENT)
Facility: HOSPITAL | Age: 37
LOS: 4 days | Discharge: HOSPICE | DRG: 441 | End: 2017-10-27
Attending: EMERGENCY MEDICINE | Admitting: INTERNAL MEDICINE
Payer: COMMERCIAL

## 2017-10-23 ENCOUNTER — APPOINTMENT (OUTPATIENT)
Dept: GENERAL RADIOLOGY | Facility: HOSPITAL | Age: 37
DRG: 441 | End: 2017-10-23
Attending: EMERGENCY MEDICINE
Payer: COMMERCIAL

## 2017-10-23 ENCOUNTER — APPOINTMENT (OUTPATIENT)
Dept: CT IMAGING | Facility: HOSPITAL | Age: 37
DRG: 441 | End: 2017-10-23
Attending: EMERGENCY MEDICINE
Payer: COMMERCIAL

## 2017-10-23 DIAGNOSIS — E86.0 DEHYDRATION: ICD-10-CM

## 2017-10-23 DIAGNOSIS — E87.1 HYPONATREMIA: ICD-10-CM

## 2017-10-23 DIAGNOSIS — R17 ELEVATED BILIRUBIN: ICD-10-CM

## 2017-10-23 DIAGNOSIS — R79.89 ELEVATED LFTS: ICD-10-CM

## 2017-10-23 DIAGNOSIS — D69.6 THROMBOCYTOPENIA (HCC): ICD-10-CM

## 2017-10-23 DIAGNOSIS — N28.9 RENAL INSUFFICIENCY: ICD-10-CM

## 2017-10-23 DIAGNOSIS — D64.9 ANEMIA, UNSPECIFIED TYPE: ICD-10-CM

## 2017-10-23 DIAGNOSIS — E87.6 HYPOKALEMIA: ICD-10-CM

## 2017-10-23 DIAGNOSIS — K92.0 HEMATEMESIS WITH NAUSEA: ICD-10-CM

## 2017-10-23 DIAGNOSIS — R79.1 ELEVATED INR: ICD-10-CM

## 2017-10-23 DIAGNOSIS — K72.00 ACUTE LIVER FAILURE WITHOUT HEPATIC COMA: Primary | ICD-10-CM

## 2017-10-23 PROBLEM — D68.9 COAGULOPATHY (HCC): Status: ACTIVE | Noted: 2017-10-23

## 2017-10-23 PROBLEM — R10.84 GENERALIZED ABDOMINAL PAIN: Status: ACTIVE | Noted: 2017-10-23

## 2017-10-23 PROBLEM — I85.11 SECONDARY ESOPHAGEAL VARICES WITH BLEEDING (HCC): Status: ACTIVE | Noted: 2017-10-23

## 2017-10-23 PROBLEM — K72.90 HEPATIC ENCEPHALOPATHY (HCC): Status: ACTIVE | Noted: 2017-10-23

## 2017-10-23 PROBLEM — R18.8 CIRRHOSIS OF LIVER WITH ASCITES (HCC): Status: ACTIVE | Noted: 2017-10-23

## 2017-10-23 PROBLEM — K74.60 CIRRHOSIS OF LIVER WITH ASCITES (HCC): Status: ACTIVE | Noted: 2017-10-23

## 2017-10-23 PROBLEM — A41.9 SEPSIS, DUE TO UNSPECIFIED ORGANISM: Status: RESOLVED | Noted: 2017-06-02 | Resolved: 2017-10-23

## 2017-10-23 PROBLEM — K27.4 GASTROINTESTINAL HEMORRHAGE ASSOCIATED WITH PEPTIC ULCER: Status: ACTIVE | Noted: 2017-10-23

## 2017-10-23 PROBLEM — L03.115 CELLULITIS OF RIGHT LOWER EXTREMITY: Status: RESOLVED | Noted: 2017-06-02 | Resolved: 2017-10-23

## 2017-10-23 PROCEDURE — 74176 CT ABD & PELVIS W/O CONTRAST: CPT | Performed by: EMERGENCY MEDICINE

## 2017-10-23 PROCEDURE — 99255 IP/OBS CONSLTJ NEW/EST HI 80: CPT | Performed by: INTERNAL MEDICINE

## 2017-10-23 PROCEDURE — 99223 1ST HOSP IP/OBS HIGH 75: CPT | Performed by: INTERNAL MEDICINE

## 2017-10-23 PROCEDURE — 71010 XR CHEST AP PORTABLE  (CPT=71010): CPT | Performed by: EMERGENCY MEDICINE

## 2017-10-23 RX ORDER — SODIUM CHLORIDE 9 MG/ML
INJECTION, SOLUTION INTRAVENOUS CONTINUOUS
Status: DISCONTINUED | OUTPATIENT
Start: 2017-10-23 | End: 2017-10-24

## 2017-10-23 RX ORDER — LACTULOSE 10 G/15ML
20 SOLUTION ORAL 3 TIMES DAILY
Status: DISCONTINUED | OUTPATIENT
Start: 2017-10-23 | End: 2017-10-27

## 2017-10-23 RX ORDER — ONDANSETRON 2 MG/ML
4 INJECTION INTRAMUSCULAR; INTRAVENOUS EVERY 6 HOURS PRN
Status: DISCONTINUED | OUTPATIENT
Start: 2017-10-23 | End: 2017-10-27

## 2017-10-23 RX ORDER — POTASSIUM CHLORIDE 14.9 MG/ML
20 INJECTION INTRAVENOUS ONCE
Status: COMPLETED | OUTPATIENT
Start: 2017-10-23 | End: 2017-10-23

## 2017-10-23 RX ORDER — POTASSIUM CHLORIDE 14.9 MG/ML
20 INJECTION INTRAVENOUS ONCE
Status: COMPLETED | OUTPATIENT
Start: 2017-10-23 | End: 2017-10-24

## 2017-10-23 RX ORDER — ONDANSETRON 2 MG/ML
4 INJECTION INTRAMUSCULAR; INTRAVENOUS
Status: DISCONTINUED | OUTPATIENT
Start: 2017-10-23 | End: 2017-10-23

## 2017-10-23 RX ORDER — HYDROMORPHONE HYDROCHLORIDE 1 MG/ML
0.5 INJECTION, SOLUTION INTRAMUSCULAR; INTRAVENOUS; SUBCUTANEOUS EVERY 2 HOUR PRN
Status: DISCONTINUED | OUTPATIENT
Start: 2017-10-23 | End: 2017-10-27

## 2017-10-23 RX ORDER — SODIUM CHLORIDE 9 MG/ML
INJECTION, SOLUTION INTRAVENOUS CONTINUOUS
Status: DISCONTINUED | OUTPATIENT
Start: 2017-10-23 | End: 2017-10-23

## 2017-10-23 RX ORDER — ONDANSETRON 2 MG/ML
4 INJECTION INTRAMUSCULAR; INTRAVENOUS EVERY 4 HOURS PRN
Status: DISCONTINUED | OUTPATIENT
Start: 2017-10-23 | End: 2017-10-23 | Stop reason: ALTCHOICE

## 2017-10-23 NOTE — H&P
ANA MARIA HOSPITALIST  History and Physical     Bety Lu Patient Status:  Emergency    1980 MRN [id number]   Location [facility] Attending Ana Bowden MD   Hosp Day # 0 PCP None Pcp     Chief Complaint: abdominal tablet Rfl: 5   lactulose 10 GM/15ML Oral Solution Take 30 mL (20 g total) by mouth 2 (two) times daily. Disp: 1 Bottle Rfl: 5   ferrous sulfate 325 (65 FE) MG Oral Tab EC Take 1 tablet (325 mg total) by mouth 2 (two) times daily with meals.  Disp: 60 table ascites with possible SBP  1. Currently afebrile and no leukocytosis   2. Ceftriaxone empirically   3. CT abdomen   4. Monitor for fever, WBC  5. Will need paracentesis   6. GI consult   2.  GIB- with underlying esophageal varices/ coagulopathy/ thrombocyto

## 2017-10-23 NOTE — ED PROVIDER NOTES
Patient Seen in: [facility] Emergency Department    History   Patient presents with:  Bleeding (hematologic)  Nausea/Vomiting/Diarrhea (gastrointestinal)    Stated Complaint: rectal bleeding/ vomiting blood    HPI    Patient was discharged from the vomiting blood  Other systems are as noted in HPI. Constitutional and vital signs reviewed. All other systems reviewed and negative except as noted above. PSFH elements reviewed from today and agreed except as otherwise stated in HPI.     Physical Protein 5.8 (*)     Albumin 1.8 (*)     Sodium 116 (*)     Potassium 3.1 (*)     Chloride 84 (*)     CO2 18.0 (*)     All other components within normal limits   URINALYSIS WITH CULTURE REFLEX - Abnormal; Notable for the following:     Urine Color Timmyval Jeanette Junaid Jones were created for panel order TYPE AND SCREEN.   Procedure                               Abnormality         Status                     ---------                               -----------         ------                     ABORH (BLOOD N)[[id number]] normal white count with no left shift. Hemoglobin is 12 with platelet count 45    CT abdomen   1. The visualized aspects of the lower chest reveal a large left pleural effusion with complete collapse of the left lower lobe.   2. There is a moderate to larg patient, family, and clinical staff.     Present on Admission  Date Reviewed: 10/23/2017          ICD-10-CM Noted POA    * (Principal)Cirrhosis of liver with ascites ([facility] [address] K74.60 10/23/2017 Yes    Acute liver failure without hepatic coma K72.00 6/2/2017 Bradyk

## 2017-10-24 PROCEDURE — 99233 SBSQ HOSP IP/OBS HIGH 50: CPT | Performed by: HOSPITALIST

## 2017-10-24 PROCEDURE — 99233 SBSQ HOSP IP/OBS HIGH 50: CPT | Performed by: INTERNAL MEDICINE

## 2017-10-24 PROCEDURE — 02HV33Z INSERTION OF INFUSION DEVICE INTO SUPERIOR VENA CAVA, PERCUTANEOUS APPROACH: ICD-10-PCS | Performed by: HOSPITALIST

## 2017-10-24 PROCEDURE — 30233R1 TRANSFUSION OF NONAUTOLOGOUS PLATELETS INTO PERIPHERAL VEIN, PERCUTANEOUS APPROACH: ICD-10-PCS | Performed by: HOSPITALIST

## 2017-10-24 RX ORDER — DEXTROSE AND SODIUM CHLORIDE 5; .9 G/100ML; G/100ML
INJECTION, SOLUTION INTRAVENOUS CONTINUOUS
Status: DISCONTINUED | OUTPATIENT
Start: 2017-10-24 | End: 2017-10-25

## 2017-10-24 RX ORDER — SODIUM CHLORIDE 9 MG/ML
INJECTION, SOLUTION INTRAVENOUS ONCE
Status: COMPLETED | OUTPATIENT
Start: 2017-10-24 | End: 2017-10-24

## 2017-10-24 RX ORDER — OCTREOTIDE ACETATE 100 UG/ML
100 INJECTION, SOLUTION INTRAVENOUS; SUBCUTANEOUS EVERY 8 HOURS
Status: DISCONTINUED | OUTPATIENT
Start: 2017-10-24 | End: 2017-10-27

## 2017-10-24 RX ORDER — PROPRANOLOL HYDROCHLORIDE 10 MG/1
10 TABLET ORAL 2 TIMES DAILY
Status: DISCONTINUED | OUTPATIENT
Start: 2017-10-24 | End: 2017-10-27

## 2017-10-24 RX ORDER — DEXTROSE MONOHYDRATE 25 G/50ML
50 INJECTION, SOLUTION INTRAVENOUS
Status: DISCONTINUED | OUTPATIENT
Start: 2017-10-24 | End: 2017-10-27

## 2017-10-24 RX ORDER — MIDODRINE HYDROCHLORIDE 5 MG/1
10 TABLET ORAL 3 TIMES DAILY
Status: DISCONTINUED | OUTPATIENT
Start: 2017-10-24 | End: 2017-10-27

## 2017-10-24 RX ORDER — SODIUM CHLORIDE 0.9 % (FLUSH) 0.9 %
10 SYRINGE (ML) INJECTION EVERY 12 HOURS
Status: DISCONTINUED | OUTPATIENT
Start: 2017-10-24 | End: 2017-10-27

## 2017-10-24 RX ORDER — ALBUMIN (HUMAN) 12.5 G/50ML
25 SOLUTION INTRAVENOUS EVERY 8 HOURS
Status: COMPLETED | OUTPATIENT
Start: 2017-10-24 | End: 2017-10-26

## 2017-10-24 NOTE — CM/SW NOTE
Requested by SW to assist MD with tertiary transfer. Spoke with Dr. Libertad Mcgrath () who is requesting [facility] transfer for medical necessity liver transplant evaluation. Per MD, pt is followed by Dr. Mary Wilkins there.     [facility] [phone]

## 2017-10-24 NOTE — PROGRESS NOTES
10/24/17 3376   Clinical Encounter Type   Visited With Patient   Patient Spiritual Encounters   Spiritual Needs Patient wanted an AD completed.    Spiritual Interventions Pt. said he was in a lot of pain and had difficulty communicating, but he did want

## 2017-10-24 NOTE — CONSULTS
[address]  Patient Status:  Inpatient    1980 MRN [id number]   [facility] 4SW-A Attending Linda Noe MD   Hosp Day # 1 PCP None Pcp     Date of Admission: 10/23/2017  Admission Diagnosis: Dehydration [E86. injection 50 mL, 50 mL, Intravenous, Q15 Min PRN **OR** glucose (DEX4) oral liquid 30 g, 30 g, Oral, Q15 Min PRN **OR** Glucose-Vitamin C (DEX-4) 4-0.006 g chewable tab 8 tablet, 8 tablet, Oral, Q15 Min PRN  •  dextrose 5 % and 0.9 % NaCl infusion, , Intra denies insomnia, depression, anxiety, or drug abuse  Endocrine: denies polydypsia, polyuria, cold/heat intolerance  Hematologic/lymphatic: + easy bruising  Allergic/immunologic: denies hay fever, hives, or new allergies     OBJECTIVE:  BP 98/46 (BP Locatio 11.2*  11.1*  10.0*   HCT  32.5*   --   31.0*  27.5*   MCV  89.0   --   90.9  91.4   MCH  32.9   --   32.6  33.2   MCHC  36.9   --   35.8  36.4   RDW  20.3*   --   20.7*  20.6*   NEPRELIM  4.55   --    --   3.30   WBC  6.2   --   5.4  4.7   PLT  45.0*   -- consider transfer to transplant center. Appreciate GI input. 4. Hyponatremia:   -improving with IVF  5. TANYA: concern for prerenal etiology vs HRS  -urine lytes ordered  -renal consulted   -gentle IVF  6. FEN:  -NPO  7. Proph:  -SCDs  8.  Dispo:   -full c

## 2017-10-24 NOTE — CONSULTS
[facility]  Report of Consultation    Annie Sanchez Patient Status:  Inpatient    1980 MRN [id number]   [facility] 4SW-A Attending Abel Diaz MD   Hosp Day # 0 PCP None Pcp       Assessment / Plan:    1) TANYA- likely due to (CHRONULAC) 10 GM/15ML solution 20 g, 20 g, Oral, TID  •  0.9%  NaCl infusion, , Intravenous, Continuous  •  0.9%  NaCl infusion, , Intravenous, Continuous  •  HYDROmorphone HCl PF (DILAUDID) 1 MG/ML injection 0.5 mg, 0.5 mg, Intravenous, Q2H PRN  •  ondan CL 84 10/23/2017   CO2 18.0 10/23/2017   GLU 84 10/23/2017   CA 7.4 10/23/2017   ALB 1.8 10/23/2017   ALKPHO 189 10/23/2017   BILT 34.3 10/23/2017   TP 5.8 10/23/2017    10/23/2017   ALT 76 10/23/2017   PTT 60.9 10/23/2017   INR 3.20 10/23/2017

## 2017-10-24 NOTE — PROGRESS NOTES
[facility]  Nephrology Progress Note    Manny Ruiz Attending:  Jeanie Bautista MD       Assessment and Plan:    1) TANYA- likely due to hepatorenal syndrome given progressive liver dysfunction; no other clear insults ie NSAIDS, contrast, etc. Med tub  Lungs: Decreased BS at bases bilaterally   Abdomen: Soft, non-tender. + bowel sounds, no palpable organomegaly  Extremities: Without clubbing, cyanosis; 1+ LE edema  Neurologic: Cranial nerves grossly intact, moving all extremities  Skin: Warm and dry (FLULAVAL) ages 7 months to 72 years inj 0.5ml 0.5 mL Intramuscular Prior to discharge         Questions/concerns were discussed with patient and/or family by bedside.           Vonda Burnett  10/24/2017  8:32 AM

## 2017-10-24 NOTE — PROGRESS NOTES
ICU  Critical Care APN Progress Note    NAME: Ziggy Alonzo - ROOM: [address] [place] [address] - MRN: [id number] - Age: 30year old - :1980    History Of Present Illness:  Ziggy Alonzo is a 30year old male with PMHx significant for EtOH abuse (now sober 6+ drinking 2014, but still wtih binges. OBJECTIVE  Vitals:  /68   Pulse 88   Temp 98.1 °F (36.7 °C) (Temporal)   Resp 16   Ht 175.3 cm (5' 9\")   Wt 200 lb (90.7 kg)   SpO2 93%   BMI 29.53 kg/m²     Physical Exam:    General Appearance:  Al field atelectasis or consolidation.     Dictated by: Eddi Villegas MD on 10/23/2017 at 18:31     Approved by: Eddi Villegas MD              Labs:    Lab Results  Component Value Date   WBC 6.2 10/23/2017   HGB 12.0 10/23/2017   HCT 32.5 10/23/2017 denying EtOH intake for past 6+ months, though disease state continues to progress  - Further workup per GI    F/E/N:    -IVF  -Replete lytes per protocol  -NPO    Proph:  -No a/c at this time d/t evidence of bleeding & thrombocytopenia  -SCD    Dispo:   -

## 2017-10-24 NOTE — CONSULTS
[facility]                       [facility]ation[facility]    Mariama Jones Patient Status:  Inpatient    1980 MRN [id number]   [facility] 4SW-A Attending Emile Bhatti MD   Hosp Day # 1 PCP None suggested large left pleural effusion, mod-large intra abd ascites with evidence of mesenteric, sigmoid, and duodenal edema, and extensive intra-abd varices; labs reveal coagulopathy (INR 3.4 s/p Vit K now 2.98), TANYA (BUN/Creat 11/2.2), elevated LFTs with IVPB-minibag/addvantage 1 g Intravenous Q24H   [COMPLETED] potassium chloride IVPB premix 20 mEq 20 mEq Intravenous Once   influenza vaccine split quad (FLULAVAL) ages 6 months to 65 years inj 0.5ml 0.5 mL Intramuscular Prior to discharge     Allergies: No with dried blood with dry mucosal membranes  Eyes: Sclerae are icteric  Neck:  Supple without nuchal rigidity  CV: tachycardic, regular   Resp: Diminished breath sounds bilaterally, near absent sounds on left  Abdomen: Soft, mild diffuse tenderness with mo hypertension. TECHNIQUE:  Unenhanced multislice CT scanning was performed from the dome of the diaphragm to the pubic symphysis. Dose reduction techniques were used.  Dose information is transmitted to the [facility]) Felton Foote 35 Carla Amos collapse of the left lower lobe.     =====  CONCLUSION:    1. The visualized aspects of the lower chest reveal a large left pleural effusion with complete collapse of the left lower lobe.   2. There is a moderate to large degree of intra-abdominal ascites w asterixis. Since admission, he has passed 3 loose non-bloody stools and has been without hematemesis. Recommendations:     1. US guided paracentesis with fluid for cell count/culture; will need to receive FFP and plts prior to tap   2.  Start Xifaxan 55

## 2017-10-24 NOTE — PLAN OF CARE
GASTROINTESTINAL - ADULT    • Minimal or absence of nausea and vomiting Progressing    • Maintains adequate nutritional intake (undernourished) Progressing        HEMATOLOGIC - ADULT    • Maintains hematologic stability Progressing    • Free from bleeding

## 2017-10-24 NOTE — PROGRESS NOTES
ANA MARIA HOSPITALIST  Progress Note     James Olivier Patient Status:  Inpatient    1980 MRN [id number]   Location [facility] 4SW-A Attending Yannick Shrestha MD   Hosp Day # 1 PCP None Pcp     Chief Complaint:  abd pain     S: Patient in bed, PTP  33.5*  35.1*  31.6*   INR  3.20*  3.40*  2.98*       No results for input(s): TROP, CK in the last 72 hours. Imaging: Imaging data reviewed in Epic. Ct abd    CONCLUSION:    1.  The visualized aspects of the lower chest reveal a large left p 1. IVF  2. Renal consult   3. Follow   6. Hypokalemia - replace   7. Coagulopathy 2/2 cirrhosis-   1. Vitamin K in light of GIB   2. Monitor INR  3. INR lower 2.98   8. Hyperbilirubinemia with elevated LFTs. 1. Per above   2. GI following   9.  Pancytop

## 2017-10-24 NOTE — PAYOR COMM NOTE
--------------  ADMISSION REVIEW     Payor: [address] [place] [address] JUDI/KATRINA  Subscriber #:  [id number]  Authorization Number: [id number]    Admit date: 10/23/17  Admit time: 2012       Admitting Physician: Stephane Worthington MD  Attending Physician:  Alberto Clarke MD  Pr [10/23/17 1606]  Pulse: 86 [10/23/17 1608]  Resp: 20 [10/23/17 1606]  Temp: 98.1 °F (36.7 °C) [10/23/17 1606]  Temp src: Temporal [10/23/17 1606]  SpO2: 96 % [10/23/17 1608]  O2 Device: None (Room air) [10/23/17 1608][SS.2]    Current:[SS.1]/59   Pul Clarity Urine Cloudy (*)     Glucose Urine 50  (*)     Bilirubin Urine Moderate (*)     Ketones Urine Trace (*)     Blood Urine Small (*)     Urobilinogen Urine 4.0 (*)     WBC Urine 5-10 (*)     Bacteria Urine 3+ (*)     Hyaline Casts Present (*)     Muco likely secondary to venous congestion causing swelling/edema of the bowel. 3. Diffuse intra-abdominal varices as described above. 4. Diffuse hepatic cirrhosis and splenomegaly.     chest x-ray  Large left pleural effusion with associated left mid and lowe varices which was banded during last admission.  He is presented today with ~2 weeks of progressive generalized abdominal pain, nausea, vomiting, chills, decreased appetite, increase jaundice, and per patient he has been having melena and hematochezia, blee rhonchi. Cardiovascular: S1, S2. Regular rate and rhythm. No murmurs, rubs or gallops. Chest and Back: No tenderness or deformity. Abdomen: Soft,[NB.1] + generalized tenderness, ascites[NB.2]. Neurologic: No focal neurological deficits.  CNII-XII gross suppression[NB.1]   1. Monitor Plt, if further drop in Plt with active bleed, will need transfusion[NB.2]   10. Malnutrition    Quality:  · DVT Prophylaxis:[NB.1] SCD[NB.2]  · CODE status:[NB.1] FULL[NB.2]  · Landis:[NB.1] none[NB. 2]         MEDICATIONS ADM Given 40 mg Intravenous Raoul Baugh RN    10/23/2017 2100 Given 40 mg Intravenous Mitchell Sosa RN      phytonadione (VITAMIN K) oral syringe 10 mg     Date Action Dose Route User    10/24/2017 0944 Given 10 mg Oral Raoul Baugh RN      phyto paracentesis; agree with empiric ceftriaxone + imaging; GI to evaluate     3) GIB- + h/o varices / coagulopathy / thrombocytopenia s/p recent banding.  PPI / Vit K / GI to see     4) Hepatic encephalopathy- exac by above; on lactulose     5) Hyponatremia- d

## 2017-10-25 PROCEDURE — 99233 SBSQ HOSP IP/OBS HIGH 50: CPT | Performed by: INTERNAL MEDICINE

## 2017-10-25 PROCEDURE — 99232 SBSQ HOSP IP/OBS MODERATE 35: CPT | Performed by: HOSPITALIST

## 2017-10-25 NOTE — PLAN OF CARE
GASTROINTESTINAL - ADULT    • Maintains adequate nutritional intake (undernourished) Not Progressing        Patient/Family Goals    • Patient/Family Long Term Goal Not Progressing          GASTROINTESTINAL - ADULT    • Minimal or absence of nausea and vomi

## 2017-10-25 NOTE — PROGRESS NOTES
Pulmonary Progress Note        NAME: Fletcher Estrella - ROOM: 460/460-A - MRN: [id number] - Age: 30year old - : 1980        SUBJECTIVE: Notes some mild abd pain this am, hoping that he can tolerate a diet today    OBJECTIVE:   10/25/17  0500 10/25/ > = values in this interval not displayed.       Recent Labs   10/23/17  1630 10/24/17  0007 10/24/17  0503 10/25/17  0400   * 119* 120* 126*   K 3.1* 3.4* 3.4* 3.5*   CL 84* 90* 90* 95*   CO2 18.0* 16.0* 18.0* 22.0   BUN 9 9 11 13   CA 7.4* 7.3* 7.4* diet  10. Proph:  -SCDs  11.  Dispo:   -full code  -pending transfer to [address] [place] [address] Pulmonary and Critical Care

## 2017-10-25 NOTE — PAYOR COMM NOTE
--------------  CONTINUED STAY REVIEW    Payor: [address] [place] [address] JUDI/KATRINA  Subscriber #:  [id number]  Authorization Number: [id number]    Admit date: 10/23/17  Admit time: 2012    Admitting Physician: Hussein Cash MD  Attending Physician:  MD JESSICA Nixon no murmur, click, rub or gallop, regular rate and rhythm  Abdomen: soft, mildly distended, hypoactive BS  Extremities: edema 1+ edema melissa        Recent Labs    10/23/17  1630   10/24/17  0007 10/24/17  0503 10/24/17  1253 10/24/17  2020 10/25/17  0400   WB following  -gentle IVF  6. Anemia/pancytopenia  -hgb trending down over the past 24hrs  -suspect some blood loss related to varices as well as dilution, probable decreased production due to critical illness  -monitor and transfuse for Hgb <8  7.  Coagulopat Zora Angeles, FARZANEH    10/24/2017 1653 Given 10 mg Oral Elyssa Davis RN    10/24/2017 1317 Given 10 mg Oral Elyssa Davis RN      Normal Saline Flush 0.9 % injection 10 mL     Date Action Dose Route User    10/25/2017 1245 Given 10 mL Intravenous Sims,

## 2017-10-25 NOTE — CM/SW NOTE
Call placed to [facility], spoke with  Jin Wells. Per Iesha, pt is accepted there under care of Dr. Cornelio Walden but still pending open ICU bed and insurance prior [facility].       Plan: Pt is accepted to [facility] pending insurance prior [facility] and

## 2017-10-25 NOTE — PROGRESS NOTES
ANA MARIA HOSPITALIST  Progress Note     Chestine Prim Patient Status:  Inpatient    1980 MRN [id number]   [facility] 4SW-A Attending JESSICA Molina MD   Hosp Day # 2 PCP None Pcp     Chief Complaint:  abd pain     S: Patient  Tolerated so Recent Labs   Lab  10/24/17   0007  10/24/17   0503  10/25/17   0400   PTP  35.1*  31.6*  31.5*   INR  3.40*  2.98*  2.97*       No results for input(s): TROP, CK in the last 72 hours. Imaging: Imaging data reviewed in Epic.     MICRO:   Medicatio tx to [facility] - awaiting ICU bed  Insurance approval   · Father aware of plans for tx per GI note     Estimated date of discharge:  tx to [facility]  Discharge is dependent on: when bed at [facility]  At this point Mr. Kiran Presumsylwia is expected to be discharge to:  tbd    Plan of

## 2017-10-25 NOTE — CM/SW NOTE
Tommy Julian at [facility], insurance prior Igor Jackson has been obtained for pt to transfer there. Still however, pending open ICU bed availability.   Addis Gunn, 10/25/17, 1:48 PM

## 2017-10-25 NOTE — BH PROGRESS NOTE
[address] [facility] Resource Referral Counselor Note    Merlyn Horn Patient Status:  Inpatient    1980 MRN [id number]   [facility] 4SW-A Attending Floyd Moore MD   Hosp Day # 2 PCP None Pcp       S(subjective) The pt admits t

## 2017-10-25 NOTE — CM/SW NOTE
Call out to [facility]. Per coordinator, no open ICU bed yet but one is anticipated as \"likely\" available later tonight. Updated clinical RN on above.   Selam Harrington, 10/25/17, 5:09 PM

## 2017-10-25 NOTE — PROGRESS NOTES
[facility]    Gastroenterology Follow-Up Note      Maximela Tariq Patient Status:  Inpatient    1980 MRN [id number]   [facility] 4SW-A Attending Nelsno Galloway, [address] [facility] [address] Day # 2 PCP None Pcp     Chief Complaint/Reason for Cheril Pour bm's daily. Ascites  *No tap required in setting of severe coagulopathy, TANYA and likely transfer to tertiary transplant center (UIC). Continue empiric antibiotics.      TANYA  *Renal following  *Agree with HRS cocktail  *Maintaining urine output for now

## 2017-10-25 NOTE — PROGRESS NOTES
[facility]  Nephrology Progress Note    Cristian Aguirre Attending:  Josué Alvarez MD       Assessment and Plan:    1) TANYA- likely due to hepatorenal syndrome given progressive liver dysfunction; no other clear insults ie NSAIDS, contrast, etc. Med tub  Lungs: Decreased BS at bases bilaterally   Abdomen: Soft, non-tender. + bowel sounds, no palpable organomegaly  Extremities: Without clubbing, cyanosis; 1+ LE edema  Neurologic: Cranial nerves grossly intact, moving all extremities  Skin: Warm and dry ondansetron HCl (ZOFRAN) injection 4 mg 4 mg Intravenous Q6H PRN   Pantoprazole Sodium (PROTONIX) 40 mg in Sodium Chloride 0.9 % 10 mL IV push 40 mg Intravenous Q12H   CefTRIAXone Sodium (ROCEPHIN) 1 g in sodium chloride 0.9 % 100 mL IVPB-minibag/addvant

## 2017-10-26 PROCEDURE — 99231 SBSQ HOSP IP/OBS SF/LOW 25: CPT | Performed by: HOSPITALIST

## 2017-10-26 PROCEDURE — 99233 SBSQ HOSP IP/OBS HIGH 50: CPT | Performed by: INTERNAL MEDICINE

## 2017-10-26 RX ORDER — ALBUMIN (HUMAN) 12.5 G/50ML
25 SOLUTION INTRAVENOUS EVERY 8 HOURS
Status: DISCONTINUED | OUTPATIENT
Start: 2017-10-26 | End: 2017-10-27

## 2017-10-26 RX ORDER — ALBUTEROL SULFATE 2.5 MG/3ML
2.5 SOLUTION RESPIRATORY (INHALATION) EVERY 6 HOURS PRN
Status: DISCONTINUED | OUTPATIENT
Start: 2017-10-26 | End: 2017-10-27

## 2017-10-26 RX ORDER — ALBUTEROL SULFATE 2.5 MG/3ML
SOLUTION RESPIRATORY (INHALATION)
Status: COMPLETED
Start: 2017-10-26 | End: 2017-10-26

## 2017-10-26 NOTE — PROGRESS NOTES
[address]  Patient Status:  Inpatient    1980 MRN [id number]   [facility] 4SW-A Attending Kristine Ramos MD   [facility] Day # 3 PCP None Pcp     Critical Care Progress Note     Date of Admission: 10/23/2017  3:59 P 100 mL IVPB-minibag/addvantage, 1 g, Intravenous, Q24H  •  influenza vaccine split quad (FLULAVAL) ages 6 months to 65 years inj 0.5ml, 0.5 mL, Intramuscular, Prior to discharge     OBJECTIVE:  /54 (BP Location: Left arm)   Pulse 72   Temp 99.5 °F (3 3.01*   --   2.61*   --    --   2.60*   HGB  10.0*   < >  8.8*  9.5*  9.0*  8.9*   HCT  27.5*   --   24.2*   --    --   25.1*   MCV  91.4   --   92.7   --    --   96.5   MCH  33.2   --   33.7*   --    --   34.2*   MCHC  36.4   --   36.4   --    --   35.5

## 2017-10-26 NOTE — PLAN OF CARE
GASTROINTESTINAL - ADULT    • Minimal or absence of nausea and vomiting Progressing        HEMATOLOGIC - ADULT    • Free from bleeding injury Progressing        Impaired Cognition    • Patient will exhibit improved attention, thought processing and/or elana

## 2017-10-26 NOTE — CM/SW NOTE
Spoke with [facility] Al, who advises they are still on bed hold, with no open ICU bed currently available. Al not able to give estimate on potential bed availability.     Updated Karson Marcano and Emir Able on above as they are here now in ICU see

## 2017-10-26 NOTE — PROGRESS NOTES
ANA MARIA HOSPITALIST  Progress Note     Kandy Montelongo Patient Status:  Inpatient    1980 MRN [id number]   [facility] 4SW-A Attending JESSICA Molina MD   Hosp Day # 3 PCP None Pcp     Chief Complaint:  abd pain     S: Patient   Sleeping bu 14   CREATSERUM  2.22*  2.92*   --   3.30*   CA  7.4*  7.5*   --   7.8*   ALB  1.7*  2.3*   --   2.4*   NA  120*  126*   --   130*   K  3.4*  3.5*  4.5  3.6  3.6   CL  90*  95*   --   101   CO2  18.0*  22.0   --   22.0   ALKPHO  166*  126*   --   122*   A stable   9. Pancytopenia suspect chronic 2/2 bone marrow suppression   10. Alcoholic cirrhosis    1. Despite his abstinence he has had further decompensation in liver failure, which he would benefit from transplant evaluation. 11. Malnutrition  12.  Large 2.22*  2.92*   --   3.30*   CA  7.4*  7.5*   --   7.8*   ALB  1.7*  2.3*   --   2.4*   NA  120*  126*   --   130*   K  3.4*  3.5*  4.5  3.6  3.6   CL  90*  95*   --   101   CO2  18.0*  22.0   --   22.0   ALKPHO  166*  126*   --   122*   AST  172*  133*   -

## 2017-10-26 NOTE — PROGRESS NOTES
[facility]  Nephrology Progress Note    Virgilio Potter Attending:  Tj Magana MD       Assessment and Plan:    1) TANYA- all findings c/w hepatorenal syndrome- no improvement despite fluid resuscitation / albumin / octreotide / midodrine.  No abs extremities  Skin: Warm and dry, no rashes       Labs:     Lab Results  Component Value Date   WBC 3.1 10/26/2017   HGB 8.9 10/26/2017   HCT 25.1 10/26/2017   PLT 38.0 10/26/2017   CREATSERUM 3.30 10/26/2017   BUN 14 10/26/2017    10/26/2017   K 3.6 Questions/concerns were discussed with patient and/or family by bedside.           Vonda Burnett  10/26/2017  1025 AM

## 2017-10-26 NOTE — PROGRESS NOTES
[facility]    Gastroenterology Follow-Up Note      Nayan Fisher Patient Status:  Inpatient    1980 MRN [id number]   [facility] 4SW-A Attending Josiah Simmons, [address] [facility] Se Day # 3 PCP None Pcp     Chief Complaint/Reason for CBS Encephalopathy  *Continue lactulose and xifaxan. Titrate to 3-4 bm's daily. Ascites  *No tap required in setting of severe coagulopathy, TANYA and likely transfer to tertiary transplant center ([facility]). Will broaden abx to zosyn in setting of worsening TANYA.

## 2017-10-27 ENCOUNTER — APPOINTMENT (OUTPATIENT)
Dept: GENERAL RADIOLOGY | Facility: HOSPITAL | Age: 37
DRG: 441 | End: 2017-10-27
Attending: EMERGENCY MEDICINE
Payer: COMMERCIAL

## 2017-10-27 ENCOUNTER — APPOINTMENT (OUTPATIENT)
Dept: ULTRASOUND IMAGING | Facility: HOSPITAL | Age: 37
DRG: 441 | End: 2017-10-27
Attending: INTERNAL MEDICINE
Payer: COMMERCIAL

## 2017-10-27 VITALS
HEIGHT: 69 IN | BODY MASS INDEX: 31.21 KG/M2 | OXYGEN SATURATION: 98 % | RESPIRATION RATE: 16 BRPM | HEART RATE: 63 BPM | SYSTOLIC BLOOD PRESSURE: 109 MMHG | WEIGHT: 210.75 LBS | DIASTOLIC BLOOD PRESSURE: 56 MMHG | TEMPERATURE: 99 F

## 2017-10-27 PROCEDURE — 93971 EXTREMITY STUDY: CPT | Performed by: INTERNAL MEDICINE

## 2017-10-27 PROCEDURE — 71010 XR CHEST AP PORTABLE  (CPT=71010): CPT | Performed by: NURSE PRACTITIONER

## 2017-10-27 PROCEDURE — 99239 HOSP IP/OBS DSCHRG MGMT >30: CPT | Performed by: HOSPITALIST

## 2017-10-27 PROCEDURE — 99233 SBSQ HOSP IP/OBS HIGH 50: CPT | Performed by: INTERNAL MEDICINE

## 2017-10-27 NOTE — PROGRESS NOTES
ANA [facility] HOSPITALIST  Progress Note     Allayne Res Patient Status:  Inpatient    1980 MRN [id number]   [facility] 4SW-A Attending JESSICA Molina MD   Hosp Day # 4 PCP None Pcp     Chief Complaint:  abd pain     S: Patient  Some mild SO 4.5*       Recent Labs   Lab  10/25/17   0400  10/26/17   0420  10/27/17   0410   PTP  31.5*  31.8*  36.2*   INR  2.97*  3.00*  3.53*       No results for input(s): TROP, CK in the last 72 hours. Imaging: Imaging data reviewed in Epic.     MICRO:   Medi 13. Thrombocytopenia- 37 K today,          Quality:  · DVT Prophylaxis: SCD  · CODE status: FULL  · Landis: none     Plan of care:   · accepted at [facility]- insurance approved tx. tx at 1500. Father aware. Needs liver txpl- liver cont to fail.      Estimated d

## 2017-10-27 NOTE — CM/SW NOTE
[facility] notifies that they have accepted pt there to ICU under care of Dr. Devora Gallo. Open ICU bed available, pending insurance authorization. [facility] notifies they have accepted pt to ICU under care of Dr. Allie Newby but there are no open beds currently available.

## 2017-10-27 NOTE — PROGRESS NOTES
[facility]    Gastroenterology Follow-Up Note      James Olivier Patient Status:  Inpatient    1980 MRN [id number]   [facility] 4SW-A Attending Army Amado MD   Hosp Day # 4 PCP None Pcp     Chief Complaint/Reason for CBS shortness of breath) and overall decompensated cirrhosis. An expedited transfer to transplant hepatology center is imperative however [facility] without ICU bed availability at this time.      Plan:    Severe LUE Edema  *LUE Doppler    Hepatic Encephalopathy  *Con

## 2017-10-27 NOTE — CM/SW NOTE
Called to [employer]. Per Iesha, no open ICU bed yet available. No open bed currently anticipated. Per request of ROSIE Rodriguez 3 Shayan Fan, will reach out to [place] [phone] for medical necessity tertiary eval for liver transplant evaluations.  Per MD, ne

## 2017-10-27 NOTE — PROGRESS NOTES
[address]  Patient Status:  Inpatient    1980 MRN [id number]   [facility] 4SW-A Attending Lory Smalls MD   [facility] Day # 4 PCP None Pcp     Critical Care Progress Note     Date of Admission: 10/23/2017  3:59 P PRN  •  ondansetron HCl (ZOFRAN) injection 4 mg, 4 mg, Intravenous, Q6H PRN  •  Pantoprazole Sodium (PROTONIX) 40 mg in Sodium Chloride 0.9 % 10 mL IV push, 40 mg, Intravenous, Q12H  •  influenza vaccine split quad (FLULAVAL) ages 6 months to 65 years inj Lab  10/25/17   0400   10/26/17   0420  10/26/17   1643  10/27/17   0410   RBC  2.61*   --   2.60*   --   2.54*   HGB  8.8*   < >  8.9*  8.8*  8.7*   HCT  24.2*   --   25.1*   --   25.3*   MCV  92.7   --   96.5   --   99.6*   MCH  33.7*   --   34.2*   -- oxygen requirements  -hold on thora given coagulopathy and lack of resp distress  10. FEN:  -low Na diet  11. Proph:  -SCDs  12.  Dispo:   -full code  -pending transfer to [facility] vs another tertiary care center for transplant evaluation given high MELD - await

## 2017-10-27 NOTE — CM/SW NOTE
Carlos called back shortly ago to advise out of network and not able to accept pt. Les called here now and advises in network with insurance and able to accept pt there now. [facility]   Updated RN, father, Karson Houston,

## 2017-10-27 NOTE — PROGRESS NOTES
[facility]  Nephrology Progress Note    Andrea Living Attending:  Hannah Cottrell MD       Assessment and Plan:    1) TANYA- all findings c/w hepatorenal syndrome- no improvement despite fluid resuscitation / albumin / octreotide / midodrine.  No abs Value Date   WBC 3.3 10/27/2017   HGB 8.7 10/27/2017   HCT 25.3 10/27/2017   PLT 43.0 10/27/2017   CREATSERUM 3.65 10/27/2017   BUN 15 10/27/2017    10/27/2017   K 3.8 10/27/2017    10/27/2017   CO2 22.0 10/27/2017    10/27/2017   CA 7.7 0.5ml 0.5 mL Intramuscular Prior to discharge         Questions/concerns were discussed with patient and/or family by bedside.           Vonda Burnett  10/27/2017  1150 AM

## 2017-10-27 NOTE — PAYOR COMM NOTE
--------------  CONTINUED STAY REVIEW    Payor: Nava LAU/KATRINA  Subscriber #:  [id number]  Authorization Number: [id number]    Admit date: 10/23/17  Admit time: 2012    Admitting Physician: You Sahu MD  Attending Physician:  MD JESSICA Amezquita 8. 8*  9.5*  9.0*  8.9*   MCV  89.0   --   90.9  91.4   --    --   92.7   --    --   96.5   PLT  45.0*   --   34.0*  46.0*   --    --   42.0*   --    --   38.0*   INR  3.20*   --   3.40*  2.98*   --    --   2.97*   --    --   3.00*    < > = values in this i encephalopathy-  1. Lactulose tid    2. Monitor NH4  Lower today    4. Acute kidney injury likely from Hepatorenal syndrome      1. Renal following  2. Cr worsening  3. Albumin per neph   5. Hyponatremia   1. IVF  2. Renal following  3. Improving    6.  Hyp 33. 2   --   33.7*   --    --   34.2*   MCHC  36.4   --   36.4   --    --   35.5   RDW  20.6*   --   20.9*   --    --   20.8*   NEPRELIM  3.30   --   2.08   --    --   1.83   WBC  4.7   --   3.3*   --    --   3.1*   PLT  46.0*   --   42.0*   --    --   38.0 of left arm pain and swelling this morning.   Denies SOB or chest pain.       Current Medications:     Current Facility-Administered Medications:   •  Albumin Human (ALBUMINAR) 25 % solution 25 g, 25 g, Intravenous, Q8H  •  Piperacillin Sod-Tazobactam So (Z kg)        I/O last 3 completed shifts: In: 1880 [P.O.:1200;  I.V.:80; IV PIGGYBACK:600]  Out: 1700 [Urine:1700]  No intake/output data recorded.      Physical Exam:                          General: alert, cooperative, in NAD                          HEEN I have independently visualized all relevant chest imaging in PACS. I agree with the radiology interpretation except where noted.     CXR (10/27/17): CONCLUSION:      No significant change.  Opacification lower half of the left chest from large left pleura 1103 New Bag 25 g Intravenous Clay Pascual, RN    10/27/2017 0149 New Bag 25 g Intravenous Rae Perez, FARZANEH    10/26/2017 1810 New Bag 25 g Intravenous Emy Harding RN      lactulose (CHRONULAC) 10 GM/15ML solution 20 g     Date Action Dose Route Us Misty Oates RN      rifaximin Ozell Ti) TABS 550 mg     Date Action Dose Route User    10/27/2017 1510 Given 550 mg Oral Bola Mendoza RN    10/26/2017 2124 Given 550 mg Oral Minna Fuchs RN

## 2017-10-27 NOTE — PLAN OF CARE
RESPIRATORY - ADULT    • Achieves optimal ventilation and oxygenation Not Progressing          HEMATOLOGIC - ADULT    • Maintains hematologic stability Progressing    • Free from bleeding injury Progressing        Impaired Cognition    • Patient will exhib

## 2017-10-28 NOTE — DISCHARGE SUMMARY
[facility] HOSPITALIST  DISCHARGE SUMMARY     Jennie Carrero Patient Status:  Inpatient    1980 MRN [id number]   [facility] 4SW-A Attending No att. providers found   Hosp Day # 4 PCP None Pcp     Date of Admission: 10/23/2017  Date of Avinash Chu with prior admission in June 2017 for sepsis due to cellulitis, AMS, esophageal varices which was banded during last admission.  He is presented today with ~2 weeks of progressive generalized abdominal pain, nausea, vomiting, chills, decreased appetite, inc ammonia levels have decreased. Patient has had hyponatremia due to decompensated liver disease. His coagulopathy is continued to worsen despite daily vitamin K. Patient has a MD LD score 40 prognosis is felt to be very grim without transplant.   Patient MULTIVITAL Tabs        spironolactone 50 MG Tabs  Commonly known as:  ALDACTONE                 Follow-up appointment: No follow-up provider specified.     Vital signs:  Temp:  [98.6 °F (37 °C)-99 °F (37.2 °C)] 98.6 °F (37 °C)  Pulse:  [60-73] 63  Resp:

## 2018-05-21 ENCOUNTER — LAB REQUISITION (OUTPATIENT)
Dept: LAB | Age: 38
End: 2018-05-21
Attending: INTERNAL MEDICINE
Payer: COMMERCIAL

## 2018-05-21 DIAGNOSIS — K76.2 CENTRAL HEMORRHAGIC NECROSIS OF LIVER: ICD-10-CM

## 2018-05-21 PROCEDURE — 84520 ASSAY OF UREA NITROGEN: CPT | Performed by: INTERNAL MEDICINE

## 2018-05-21 PROCEDURE — 82550 ASSAY OF CK (CPK): CPT | Performed by: INTERNAL MEDICINE

## 2018-05-21 PROCEDURE — 85025 COMPLETE CBC W/AUTO DIFF WBC: CPT | Performed by: INTERNAL MEDICINE

## 2018-05-21 PROCEDURE — 80076 HEPATIC FUNCTION PANEL: CPT | Performed by: INTERNAL MEDICINE

## 2018-07-20 NOTE — ANESTHESIA POSTPROCEDURE EVALUATION
[address] [facility] [address] Patient Status:  Inpatient   Age/Gender 31year old male MRN [id number]   Location [facility]. Attending Laura Baires MD   [facility] Day # 4 PCP None Pcp       Anesthesia Post-op Note    Procedure(s):  ESOPHAGO
pt failed dysphagia screen, swallow eval was ordered 7/9  pt now intubated, plan to start tube feeds/difficulty swallowing

## 2024-05-07 NOTE — PLAN OF CARE
GASTROINTESTINAL - ADULT    • Minimal or absence of nausea and vomiting Progressing    • Maintains adequate nutritional intake (undernourished) Progressing        HEMATOLOGIC - ADULT    • Maintains hematologic stability Progressing    • Free from bleeding denies spencer

## 2024-07-29 NOTE — PROGRESS NOTES
Patient sleeping on and off. Attempted to leave unit again, staff able to redirect him to go back to his room and lay down. Patient continues to be paranoid and agitated. Sitter at bedside. Patient continues to refuse all medical treatment. Education provided on the pain management plan of care/Side effects of pain management treatment/Activities of daily living, including home environment that might     exacerbate pain or reduce effectiveness of the pain management plan of care as well as strategies to address these issues/Opioids not applicable/not prescribed

## (undated) DEVICE — MEDI-VAC SUCTION HANDLE REGULAR CAPACITY: Brand: CARDINAL HEALTH

## (undated) DEVICE — 3M™ RED DOT™ MONITORING ELECTRODE WITH FOAM TAPE AND STICKY GEL, 50/BAG, 20/CASE, 72/PLT 2570: Brand: RED DOT™

## (undated) DEVICE — Device: Brand: DEFENDO AIR/WATER/SUCTION AND BIOPSY VALVE

## (undated) DEVICE — SPEEDBAND SUPERVIEW SUPER 7

## (undated) DEVICE — 1200CC GUARDIAN II: Brand: GUARDIAN

## (undated) DEVICE — MEDI-VAC NON-CONDUCTIVE SUCTION TUBING: Brand: CARDINAL HEALTH

## (undated) DEVICE — ENDOSCOPY PACK UPPER: Brand: MEDLINE INDUSTRIES, INC.

## (undated) DEVICE — FILTERLINE NASAL ADULT O2/CO2

## (undated) NOTE — IP AVS SNAPSHOT
Patient Demographics     Address  [place] [address] [place] [address] [place] [address] Phone  [phone] [facility] [place])  [phone] (Mobile) *Preferred* E-mail Address  [email or web address]      Emergency Contact(s)     Name Relation Home Work [address] Father [id number] Propranolol HCl (INDERAL) tab 10 mg 10/27/17 0657 Given      [id number] lactulose (CHRONULAC) 10 GM/15ML solution 20 g 10/27/17 0852 Given      [id number] phytonadione (VITAMIN K) oral syringe 10 mg 10/27/17 1253 Given      [id number] rifaximin (XI Calcium, Total 7.7 8.3 - 10.3 mg/dL L Huan Lab   Comment:           Total Calciums are not corrected for effects of low albumin. If needed, use the following correction formula.       Corrected Calcium Formula:      ((4.0 - Albumin) x 0.8 + Calcium    Not Blood — 10/27/17 0410          Components    Component Value Reference Range Flag Lab   PT 36.2 12.0 - 14.3 seconds H Edward Lab   INR 3.53 0.89 - 1.11 H Edward Lab            CBC WITH DIFFERENTIAL WITH PLATELET [[id number]]  Resulted: 10/27/17 0527, Result Specimen:  Blood from Blood,peripheral      Blood Culture Result No Growth 1 Day    STOOL CULTURE W/SHIGATOXIN Once [[id number]] Collected:  10/23/17 4899    Order Status:  Completed Lab Status:  Final result Updated:  10/26/17 1130    Specimen:  Stool fro Occult Blood Result Positive for Occult Blood (A)    Clostridium difficile(toxigenic)PCR Once [[id number]]  (Normal) Collected:  10/23/17 1719    Order Status:  Completed Lab Status:  Final result Updated:  10/23/17 2033    Specimen:  Stool from Stool Past Surgical History: History reviewed. No pertinent surgical history. Social History:  reports that he quit smoking about 5 years ago. He has quit using smokeless tobacco. He reports that he uses drugs, including Cannabis.  He reports that he does not Neurologic: No focal neurological deficits. CNII-XII grossly intact. Musculoskeletal: Moves all extremities. Extremities: No edema or cyanosis. Integument: No rashes or lesions. Psychiatric:[NB.1] lethargic[NB. 2]       Diagnostic Data:      Labs:  Rec failure, which he would benefit from transplant evaluation. 2. To further discuss with GI team  3. MELD score  40[NB.3]   11.  Malnutrition        Quality:  · DVT Prophylaxis:[NB.1] SCD[NB.2]  · CODE status:[NB.1] FULL[NB.2]  · Landis:[NB.1] none[NB.2] cough/CP/SOB/HA. He denies any tobacco, drug or alcohol use lately. His last ETOH use per patient is >6 months (cannot recall time).    He is currently hemodynamically stable.[NB.2]   Past Medical History:  Past Medical History:   Diagnosis Date   • Alcohol Respiratory: Clear to auscultation bilaterally. No wheezes. No rhonchi. Cardiovascular: S1, S2. Regular rate and rhythm. No murmurs, rubs or gallops. Chest and Back: No tenderness or deformity. Abdomen: Soft,[NB.1] + generalized tenderness, ascites[NB. 2. GI following   9. Pancytopenia suspect chronic 2/2 bone marrow suppression[NB.1]   1. Monitor Plt, if further drop in Plt with active bleed, will need transfusion[NB.2]   10.  Malnutrition        Quality:  · DVT Prophylaxis:[NB.1] SCD[NB.2]  · CODE statu onset dark stools (?melena) and scant hematochezia[KW.1] x 2 weeks or so[KW.2]. Pt denies associated fevers, chills, SOB, sputum production or chest pain. Per patient his last drink was[KW. 1] \"years ago,\" although per EMR he has told other providers 6 •  ondansetron HCl (ZOFRAN) injection 4 mg, 4 mg, Intravenous, Q6H PRN  •  Pantoprazole Sodium (PROTONIX) 40 mg in Sodium Chloride 0.9 % 10 mL IV push, 40 mg, Intravenous, Q12H  •  CefTRIAXone Sodium (ROCEPHIN) 1 g in sodium chloride 0.9 % 100 mL IVPB-mini Output                0 ml   Net             1226 ml[KW. 3]         Physical Exam:                          RSVPFOG: HTDQS, cooperative, in NAD                          HEENT: oropharynx clear without erythema or exudates, moist mucous membranes             left lower lobe. 2. There is a moderate to large degree of intra-abdominal ascites with evidence of diffuse mesenteric edema as well as evidence of significant bowel wall thickening primarily of the colon as well as involving the duodenum.  This is likely s No notes of this type exist for this encounter. Physical Therapy Notes (last 72 hours) (Notes from 10/24/2017  3:09 PM through 10/27/2017  3:09 PM)     No notes of this type exist for this encounter.       Occupational Therapy Notes (last 72 hours) (N

## (undated) NOTE — IP AVS SNAPSHOT
[facility] [place] [address] [place], [address] ~ [phone]                Discharge Summary   6/2/2017    Jerri Ag           Admission Information        Provider Department    6/2/2017 Vick Rose MD  3ne-A Last time this was given:  20 g on 6/6/2017  9:19 PM   Commonly known as:  CHRONULAC        Take 30 mL (20 g total) by mouth 2 (two) times daily.     Lizet Biggs     [    ]    [    ]    [    ]    [    ]       spironolactone 50 MG Tabs   Last time this Procedure requested time:  11:00 AM    Assisting Surgeon:      # of days to admit prior to surgery:      Special Equipment or Implants needed?:  No    Special Equipment / Implants needed:      C-ARM Needed:       Additional scheduling instructions:  (need Recent Hematology Lab Results (cont.)  (Last 3 results in the past 90 days)    Neutrophil % Lymphocyte % Monocyte % Eosinophil % Basophil % Prelim Neut Abs Final Neut Abs Lymphocyte Abso Monocyte Absolu Eosinophil Abso Basophil Absolu    (06/08/17)  48.4 ( Patient [address] to help you get signed up for insurance coverage. Patient [address] is a Federal Navigator program that can help with your Affordable Care Act coverage, as well as all types of Medicaid plans.   To get signed up and covere Most common side effects:  Allergic reactions, rash, nausea, diarrhea   What to report to your healthcare team: Tolerance of medications, temperature, rash, itching, shortness of breath, chills, nausea, and diarrhea           Water Pills     furosemide 20 M

## (undated) NOTE — LETTER
[facility]  Lela Leblanc [phone]    Consent for Operation    Date: ___6/5/17___    Time: ___1430____    1.  I authorize the performance upon Rita Smyth the following operation:    Procedure(s):  ESOPHAGOGASTRODUODENOSCOPY videotape. [facility] will not be responsible for storage or maintenance of this tape. 6. For the purpose of advancing medical education, I consent to the admittance of observers to the Operating Room.     7. I authorize the use of any specimen, organs Signature of Patient:   ___________________________    When the patient is a minor or mentally incompetent to give consent:  Signature of person authorized to consent for patient: ___________________________   Relationship to patient: _____________________ drugs/illegal medications). Failure to inform my anesthesiologist about these medicines may increase my risk of anesthetic complications. · If I am allergic to anything or have had a reaction to anesthesia before.     3. I understand how the anesthesia med I have discussed the procedure and information above with the patient (or patient’s representative) and answered their questions. The patient or their representative has agreed to have anesthesia services.     _______________________________________________